# Patient Record
Sex: MALE | Race: OTHER | Employment: FULL TIME | URBAN - METROPOLITAN AREA
[De-identification: names, ages, dates, MRNs, and addresses within clinical notes are randomized per-mention and may not be internally consistent; named-entity substitution may affect disease eponyms.]

---

## 2017-11-27 ENCOUNTER — HOSPITAL ENCOUNTER (OUTPATIENT)
Age: 67
Setting detail: SPECIMEN
Discharge: HOME | End: 2017-11-27
Payer: COMMERCIAL

## 2018-01-02 LAB — SURGICAL PATHOLOGY REPORT: NORMAL

## 2018-06-08 ENCOUNTER — OFFICE VISIT (OUTPATIENT)
Dept: FAMILY MEDICINE CLINIC | Age: 68
End: 2018-06-08
Payer: COMMERCIAL

## 2018-06-08 VITALS
OXYGEN SATURATION: 98 % | HEART RATE: 56 BPM | SYSTOLIC BLOOD PRESSURE: 118 MMHG | HEIGHT: 67 IN | BODY MASS INDEX: 26.21 KG/M2 | WEIGHT: 167 LBS | DIASTOLIC BLOOD PRESSURE: 78 MMHG

## 2018-06-08 DIAGNOSIS — K57.31 DIVERTICULOSIS LARGE INTESTINE W/O PERFORATION OR ABSCESS W/BLEEDING: ICD-10-CM

## 2018-06-08 DIAGNOSIS — E78.2 MIXED HYPERLIPIDEMIA: ICD-10-CM

## 2018-06-08 DIAGNOSIS — K63.5 POLYP OF COLON, UNSPECIFIED PART OF COLON, UNSPECIFIED TYPE: ICD-10-CM

## 2018-06-08 DIAGNOSIS — K21.9 GASTROESOPHAGEAL REFLUX DISEASE WITHOUT ESOPHAGITIS: Primary | ICD-10-CM

## 2018-06-08 DIAGNOSIS — R73.09 ABNORMAL GLUCOSE: ICD-10-CM

## 2018-06-08 LAB — HBA1C MFR BLD: 5.3 %

## 2018-06-08 PROCEDURE — 83036 HEMOGLOBIN GLYCOSYLATED A1C: CPT | Performed by: FAMILY MEDICINE

## 2018-06-08 PROCEDURE — 99203 OFFICE O/P NEW LOW 30 MIN: CPT | Performed by: FAMILY MEDICINE

## 2018-06-08 RX ORDER — OMEPRAZOLE 20 MG/1
20 CAPSULE, DELAYED RELEASE ORAL DAILY
COMMUNITY
End: 2018-11-27 | Stop reason: SDUPTHER

## 2018-06-08 ASSESSMENT — PATIENT HEALTH QUESTIONNAIRE - PHQ9
2. FEELING DOWN, DEPRESSED OR HOPELESS: 0
SUM OF ALL RESPONSES TO PHQ9 QUESTIONS 1 & 2: 0
SUM OF ALL RESPONSES TO PHQ QUESTIONS 1-9: 0
1. LITTLE INTEREST OR PLEASURE IN DOING THINGS: 0

## 2018-06-08 ASSESSMENT — ENCOUNTER SYMPTOMS
CONSTIPATION: 0
ABDOMINAL PAIN: 0
EYES NEGATIVE: 1
SHORTNESS OF BREATH: 0
DIARRHEA: 0
HEARTBURN: 1
COUGH: 0
BLOOD IN STOOL: 0

## 2018-10-16 ENCOUNTER — OFFICE VISIT (OUTPATIENT)
Dept: FAMILY MEDICINE CLINIC | Age: 68
End: 2018-10-16
Payer: COMMERCIAL

## 2018-10-16 VITALS
DIASTOLIC BLOOD PRESSURE: 78 MMHG | SYSTOLIC BLOOD PRESSURE: 112 MMHG | WEIGHT: 170 LBS | HEIGHT: 66 IN | HEART RATE: 72 BPM | BODY MASS INDEX: 27.32 KG/M2

## 2018-10-16 DIAGNOSIS — R10.9 LEFT FLANK PAIN: Primary | ICD-10-CM

## 2018-10-16 PROCEDURE — 99213 OFFICE O/P EST LOW 20 MIN: CPT | Performed by: NURSE PRACTITIONER

## 2018-10-16 PROCEDURE — 1111F DSCHRG MED/CURRENT MED MERGE: CPT | Performed by: NURSE PRACTITIONER

## 2018-10-16 RX ORDER — SULFAMETHOXAZOLE AND TRIMETHOPRIM 800; 160 MG/1; MG/1
1 TABLET ORAL
COMMUNITY
Start: 2018-10-13 | End: 2018-10-20

## 2018-10-16 RX ORDER — OXYCODONE HYDROCHLORIDE AND ACETAMINOPHEN 5; 325 MG/1; MG/1
1 TABLET ORAL
COMMUNITY
Start: 2018-10-13 | End: 2018-10-16

## 2018-10-16 RX ORDER — CYCLOBENZAPRINE HCL 5 MG
5 TABLET ORAL 3 TIMES DAILY PRN
Qty: 30 TABLET | Refills: 1 | Status: SHIPPED | OUTPATIENT
Start: 2018-10-16 | End: 2018-10-26

## 2018-10-16 RX ORDER — OXYCODONE HYDROCHLORIDE AND ACETAMINOPHEN 5; 325 MG/1; MG/1
1 TABLET ORAL EVERY 6 HOURS PRN
Qty: 28 TABLET | Refills: 0 | Status: SHIPPED | OUTPATIENT
Start: 2018-10-16 | End: 2018-10-23

## 2018-10-16 ASSESSMENT — ENCOUNTER SYMPTOMS
ALLERGIC/IMMUNOLOGIC NEGATIVE: 1
NAUSEA: 0
EYES NEGATIVE: 1
VOMITING: 0
DIARRHEA: 0
ABDOMINAL PAIN: 0
RESPIRATORY NEGATIVE: 1
WHEEZING: 0
SHORTNESS OF BREATH: 0
BACK PAIN: 1
GASTROINTESTINAL NEGATIVE: 1
COUGH: 0

## 2018-10-16 NOTE — PROGRESS NOTES
Visit Information    Have you changed or started any medications since your last visit including any over-the-counter medicines, vitamins, or herbal medicines? yes -    Have you stopped taking any of your medications? Is so, why? -  yes -   Are you having any side effects from any of your medications? - yes -     Have you seen any other physician or provider since your last visit?  no   Have you had any other diagnostic tests since your last visit? yes -    Have you been seen in the emergency room and/or had an admission in a hospital since we last saw you?  yes -    Have you had your routine dental cleaning in the past 6 months?  yes -      Do you have an active MyChart account? If no, what is the barrier?   No:     Patient Care Team:  Kole Omer MD as PCP - General (Family Medicine)    Medical History Review  Past Medical, Family, and Social History reviewed and does contribute to the patient presenting condition    Health Maintenance   Topic Date Due    Hepatitis C screen  1950    DTaP/Tdap/Td vaccine (1 - Tdap) 04/22/1971    Pneumococcal low/med risk (1 of 2 - PCV13) 04/22/2017    Flu vaccine (1) 09/01/2018    Shingles Vaccine (2 of 2 - 2 Dose Series) 10/18/2018    Diabetes screen  06/08/2021    Lipid screen  03/13/2023    Colon cancer screen colonoscopy  12/27/2027

## 2018-10-22 ENCOUNTER — OFFICE VISIT (OUTPATIENT)
Dept: FAMILY MEDICINE CLINIC | Age: 68
End: 2018-10-22
Payer: COMMERCIAL

## 2018-10-22 VITALS
HEART RATE: 68 BPM | BODY MASS INDEX: 27.6 KG/M2 | DIASTOLIC BLOOD PRESSURE: 80 MMHG | WEIGHT: 171 LBS | SYSTOLIC BLOOD PRESSURE: 120 MMHG

## 2018-10-22 DIAGNOSIS — S39.012D STRAIN OF LUMBAR REGION, SUBSEQUENT ENCOUNTER: Primary | ICD-10-CM

## 2018-10-22 PROCEDURE — 99213 OFFICE O/P EST LOW 20 MIN: CPT | Performed by: FAMILY MEDICINE

## 2018-10-22 RX ORDER — IBUPROFEN 800 MG/1
800 TABLET ORAL EVERY 8 HOURS PRN
Qty: 90 TABLET | Refills: 1 | Status: SHIPPED | OUTPATIENT
Start: 2018-10-22 | End: 2019-07-31

## 2018-10-22 ASSESSMENT — ENCOUNTER SYMPTOMS
SHORTNESS OF BREATH: 0
COUGH: 0
ABDOMINAL PAIN: 0
BACK PAIN: 1
EYES NEGATIVE: 1

## 2018-10-22 NOTE — PROGRESS NOTES
edema or deformity. Neurological: He is alert and oriented to person, place, and time. Skin: Skin is warm and dry. Psychiatric: He has a normal mood and affect. His behavior is normal.   Vitals reviewed. Assessment/PLan  1. Strain of lumbar region, subsequent encounter  Cont gentle activity until ready to return to work. - External Referral To Physical Therapy  - ibuprofen (ADVIL;MOTRIN) 800 MG tablet; Take 1 tablet by mouth every 8 hours as needed for Pain Take with food  Dispense: 90 tablet; Refill: 1      Dameon received counseling on the following healthy behaviors: nutrition, exercise and medication adherence  Reviewed prior labs and health maintenance. Continue current medications, diet and exercise. Discussed use, benefit, and side effects of prescribed medications. Barriers to medication compliance addressed. Patient given educational materials - see patient instructions. All patient questions answered. Patient voiced understanding. Return if symptoms worsen or fail to improve.         Electronically signed by Jenaro Ribeiro MD on 10/22/18 at 10:34 AM

## 2018-12-12 ENCOUNTER — OFFICE VISIT (OUTPATIENT)
Dept: FAMILY MEDICINE CLINIC | Age: 68
End: 2018-12-12
Payer: COMMERCIAL

## 2018-12-12 VITALS
WEIGHT: 172 LBS | BODY MASS INDEX: 27.76 KG/M2 | DIASTOLIC BLOOD PRESSURE: 78 MMHG | HEART RATE: 84 BPM | SYSTOLIC BLOOD PRESSURE: 128 MMHG

## 2018-12-12 DIAGNOSIS — Z23 NEED FOR PNEUMOCOCCAL VACCINATION: ICD-10-CM

## 2018-12-12 DIAGNOSIS — M18.12 ARTHRITIS OF CARPOMETACARPAL (CMC) JOINT OF LEFT THUMB: Primary | ICD-10-CM

## 2018-12-12 PROCEDURE — 90670 PCV13 VACCINE IM: CPT | Performed by: FAMILY MEDICINE

## 2018-12-12 PROCEDURE — 90471 IMMUNIZATION ADMIN: CPT | Performed by: FAMILY MEDICINE

## 2018-12-12 PROCEDURE — 99213 OFFICE O/P EST LOW 20 MIN: CPT | Performed by: FAMILY MEDICINE

## 2018-12-12 NOTE — PROGRESS NOTES
[facility] PHYSICIANS  [facility]  [address] [address] [facility] [address]  Dept: [phone]    12/12/2018    CHIEF COMPLAINT    Chief Complaint   Patient presents with    Swelling     left hand for 1 year with pain       HPI    Kevin Silva is a 79 y.o. male who presents   Chief Complaint   Patient presents with    Swelling     left hand for 1 year with pain   . Pain in left thumb cmp joint with swelling and disfiguring compared to rt thumb. Works as a  and it is getting worse, harder to do his job due to pain. He would like to see DR. Jarrell at [place] [address] who did surgery on his wife. Hand Pain    There was no injury mechanism. The pain is present in the left hand. The quality of the pain is described as aching. The pain is moderate. The pain has been constant since the incident. Pertinent negatives include no muscle weakness or numbness. Exacerbated by: repetitive work  He has tried nothing for the symptoms. Vitals:    12/12/18 0938   BP: 128/78   Pulse: 84   Weight: 172 lb (78 kg)       REVIEW OF SYSTEMS    Review of Systems   Constitutional: Negative for fever. Musculoskeletal: Positive for arthralgias (see hpi). Neurological: Negative for numbness.        PAST MEDICAL HISTORY    Past Medical History:   Diagnosis Date    Colon polyps 12/2017    Diverticulosis large intestine w/o perforation or abscess w/bleeding     GERD (gastroesophageal reflux disease)     Hyperlipidemia        FAMILY HISTORY    Family History   Problem Relation Age of Onset    Diabetes Father     Diabetes Brother        SOCIAL HISTORY    Social History     Social History    Marital status:      Spouse name: N/A    Number of children: N/A    Years of education: N/A     Occupational History    maintenance      Social History Main Topics    Smoking status: Never Smoker    Smokeless tobacco: Never Used    Alcohol use 1.2 oz/week     1 Glasses of wine, 1

## 2019-06-06 ENCOUNTER — OFFICE VISIT (OUTPATIENT)
Dept: FAMILY MEDICINE CLINIC | Age: 69
End: 2019-06-06
Payer: COMMERCIAL

## 2019-06-06 VITALS
HEART RATE: 64 BPM | SYSTOLIC BLOOD PRESSURE: 128 MMHG | WEIGHT: 178 LBS | HEIGHT: 67 IN | BODY MASS INDEX: 27.94 KG/M2 | DIASTOLIC BLOOD PRESSURE: 86 MMHG

## 2019-06-06 DIAGNOSIS — G89.29 CHRONIC PAIN OF BOTH SHOULDERS: ICD-10-CM

## 2019-06-06 DIAGNOSIS — M25.512 CHRONIC PAIN OF BOTH SHOULDERS: ICD-10-CM

## 2019-06-06 DIAGNOSIS — M75.02 ADHESIVE CAPSULITIS OF LEFT SHOULDER: Primary | ICD-10-CM

## 2019-06-06 DIAGNOSIS — M25.511 CHRONIC PAIN OF BOTH SHOULDERS: ICD-10-CM

## 2019-06-06 PROCEDURE — 99213 OFFICE O/P EST LOW 20 MIN: CPT | Performed by: FAMILY MEDICINE

## 2019-06-06 ASSESSMENT — PATIENT HEALTH QUESTIONNAIRE - PHQ9
SUM OF ALL RESPONSES TO PHQ9 QUESTIONS 1 & 2: 0
SUM OF ALL RESPONSES TO PHQ QUESTIONS 1-9: 0
SUM OF ALL RESPONSES TO PHQ QUESTIONS 1-9: 0
1. LITTLE INTEREST OR PLEASURE IN DOING THINGS: 0
2. FEELING DOWN, DEPRESSED OR HOPELESS: 0

## 2019-06-06 ASSESSMENT — ENCOUNTER SYMPTOMS
SHORTNESS OF BREATH: 0
EYES NEGATIVE: 1
COUGH: 0
ABDOMINAL PAIN: 0

## 2019-06-06 NOTE — PROGRESS NOTES
[facility] PHYS[facility]  [facility]  [address]  [address] [place] [address]  Dept: [phone]    6/6/2019    CHIEF COMPLAINT    Chief Complaint   Patient presents with    Shoulder Pain     bilateral       HPI    Porfirio Dela Cruz is a 81 y.o. male who presents   Chief Complaint   Patient presents with    Shoulder Pain     bilateral   .  Shoulder Pain    The pain is present in the left shoulder and right shoulder. This is a recurrent problem. The current episode started more than 1 month ago. There has been no history of extremity trauma. The problem occurs daily. The problem has been gradually worsening. The quality of the pain is described as aching. The pain is moderate. Associated symptoms include a limited range of motion (left shoulder). Pertinent negatives include no fever. Vitals:    06/06/19 0817   BP: 128/86   Pulse: 64   Weight: 178 lb (80.7 kg)   Height: 5' 7\" (1.702 m)       REVIEW OF SYSTEMS    Review of Systems   Constitutional: Negative for fever. HENT: Negative. Eyes: Negative. Respiratory: Negative for cough and shortness of breath. Cardiovascular: Negative for chest pain and leg swelling. Gastrointestinal: Negative for abdominal pain. Musculoskeletal: Positive for arthralgias (see hpi). Skin: Negative. Neurological: Negative for dizziness and headaches. Psychiatric/Behavioral: The patient is not nervous/anxious.         PAST MEDICAL HISTORY    Past Medical History:   Diagnosis Date    Colon polyps 12/2017    Diverticulosis large intestine w/o perforation or abscess w/bleeding     GERD (gastroesophageal reflux disease)     Hyperlipidemia        FAMILY HISTORY    Family History   Problem Relation Age of Onset    Diabetes Father     Diabetes Brother        SOCIAL HISTORY    Social History     Socioeconomic History    Marital status:      Spouse name: None    Number of children: None    Years of education: regular rhythm and normal heart sounds. No murmur heard. Pulmonary/Chest: Effort normal and breath sounds normal. He has no wheezes. He has no rales. Abdominal: Soft. There is no rebound. Musculoskeletal: He exhibits tenderness (bilat shoulders distal, posterior). He exhibits no edema or deformity. Decreased rom left shoulder,  Unable to raise over 90 degrees   Neurological: He is alert and oriented to person, place, and time. Skin: Skin is warm and dry. Psychiatric: He has a normal mood and affect. His behavior is normal.   Vitals reviewed. Assessment/PLan  1. Adhesive capsulitis of left shoulder  Cont to use as tolerated. See ortho. Discussed possible need for PT, manipulation, injection. May take nsaid as needed  - [address] [facility] [place]    2. Chronic pain of both shoulders  As above. - Amy Jaffe DO, Orthopedic Surgery, [facility] received counseling on the following healthy behaviors: nutrition, exercise and medication adherence  Reviewed prior labs and health maintenance. Continue current medications, diet and exercise. Discussed use, benefit, and side effects of prescribed medications. Barriers to medication compliance addressed. Patient given educational materials - see patient instructions. All patient questions answered. Patient voiced understanding. Return if symptoms worsen or fail to improve.         Electronically signed by Harsha Tsang MD on 6/6/19 at 8:29 AM

## 2019-06-11 DIAGNOSIS — M25.511 BILATERAL SHOULDER PAIN, UNSPECIFIED CHRONICITY: Primary | ICD-10-CM

## 2019-06-11 DIAGNOSIS — M25.512 BILATERAL SHOULDER PAIN, UNSPECIFIED CHRONICITY: Primary | ICD-10-CM

## 2019-06-13 ENCOUNTER — OFFICE VISIT (OUTPATIENT)
Dept: ORTHOPEDIC SURGERY | Age: 69
End: 2019-06-13
Payer: COMMERCIAL

## 2019-06-13 VITALS
WEIGHT: 180 LBS | HEIGHT: 67 IN | DIASTOLIC BLOOD PRESSURE: 77 MMHG | BODY MASS INDEX: 28.25 KG/M2 | RESPIRATION RATE: 20 BRPM | HEART RATE: 57 BPM | SYSTOLIC BLOOD PRESSURE: 113 MMHG

## 2019-06-13 DIAGNOSIS — M75.101 ROTATOR CUFF SYNDROME OF BOTH SHOULDERS: Primary | ICD-10-CM

## 2019-06-13 DIAGNOSIS — M75.102 ROTATOR CUFF SYNDROME OF BOTH SHOULDERS: Primary | ICD-10-CM

## 2019-06-13 PROCEDURE — 20611 DRAIN/INJ JOINT/BURSA W/US: CPT | Performed by: ORTHOPAEDIC SURGERY

## 2019-06-13 PROCEDURE — 99203 OFFICE O/P NEW LOW 30 MIN: CPT | Performed by: ORTHOPAEDIC SURGERY

## 2019-06-13 RX ORDER — LIDOCAINE HYDROCHLORIDE 10 MG/ML
4 INJECTION, SOLUTION INFILTRATION; PERINEURAL ONCE
Status: CANCELLED | OUTPATIENT
Start: 2019-06-13 | End: 2019-06-13

## 2019-06-13 RX ORDER — METHYLPREDNISOLONE ACETATE 40 MG/ML
40 INJECTION, SUSPENSION INTRA-ARTICULAR; INTRALESIONAL; INTRAMUSCULAR; SOFT TISSUE ONCE
Status: CANCELLED | OUTPATIENT
Start: 2019-06-13 | End: 2019-06-13

## 2019-06-13 NOTE — PROGRESS NOTES
[place] AND SPORTS MEDICINE  [facility]  [place] [address]  Dept: [phone]  Dept Fax: [phone]        Bilateral Shoulder - New Patient     Chief Complaint:     Chief Complaint   Patient presents with    New Patient    Shoulder Pain     Bilateral shoulder pain ongoing for a couple of months and no injury      HPI:     Raquel Tuttle is a 81y.o. year old ambidextrous hand dominant male that has had pain in the bilateral shoulder for 3 months. Patient works at [employer] as a . As far as any trauma to the shoulder, the patient indicates that he was picking up a trash bag to throw it away when he was at work at [employer] and then he felt an immediate pain in his shoulder. He states when he lifted the bag he started to have the sharp shoulder pain. He indicates that before he lifted the bag he did not have any pain in his shoulders at all. The pain is worse at night and when doing overhead activities. Weakness of the shoulder has been noted. The pain restricts activities such as getting undressed, lifting objects above the head and reaching above the head. The pain does not seem to improve with time. The following medications have been tried: Tylenol and Ibuprofen. The patient has not had a corticosteroid injection. The patient has not tried physical therapy. The patient has not had surgery. Neck pain has not been present. Review of Systems   Constitutional: Positive for activity change. Negative for appetite change. Respiratory: Negative for apnea, cough, chest tightness and shortness of breath. Cardiovascular: Negative for chest pain, palpitations and leg swelling. Gastrointestinal: Negative for abdominal distention, abdominal pain, constipation, diarrhea, nausea and vomiting. Genitourinary: Negative for difficulty urinating, dysuria and hematuria. Musculoskeletal: Positive for arthralgias.  Negative for gait problem, joint swelling and myalgias. Skin: Negative for color change and rash. Neurological: Negative for dizziness, weakness, numbness and headaches. Psychiatric/Behavioral: Negative for sleep disturbance. Past Medical History:    Past Medical History:   Diagnosis Date    Colon polyps 12/2017    Diverticulosis large intestine w/o perforation or abscess w/bleeding     GERD (gastroesophageal reflux disease)     Hyperlipidemia      Past Surgical History:    Past Surgical History:   Procedure Laterality Date    COLONOSCOPY  2017    KNEE ARTHROPLASTY Right      Current Medications:   Current Outpatient Medications   Medication Sig Dispense Refill    ibuprofen (ADVIL;MOTRIN) 800 MG tablet Take 1 tablet by mouth every 8 hours as needed for Pain Take with food 90 tablet 1     No current facility-administered medications for this visit. Allergies:    Patient has no known allergies. Social History:   Social History     Socioeconomic History    Marital status:      Spouse name: None    Number of children: None    Years of education: None    Highest education level: None   Occupational History    Occupation: maintenance   Social Needs    Financial resource strain: None    Food insecurity:     Worry: None     Inability: None    Transportation needs:     Medical: None     Non-medical: None   Tobacco Use    Smoking status: Never Smoker    Smokeless tobacco: Never Used   Substance and Sexual Activity    Alcohol use:  Yes     Alcohol/week: 1.2 oz     Types: 1 Glasses of wine, 1 Cans of beer per week    Drug use: No    Sexual activity: Yes     Partners: Female   Lifestyle    Physical activity:     Days per week: None     Minutes per session: None    Stress: None   Relationships    Social connections:     Talks on phone: None     Gets together: None     Attends Jew service: None     Active member of club or organization: None     Attends meetings of clubs or organizations: None Relationship status: None    Intimate partner violence:     Fear of current or ex partner: None     Emotionally abused: None     Physically abused: None     Forced sexual activity: None   Other Topics Concern    None   Social History Narrative    None     Family History:  Family History   Problem Relation Age of Onset    Diabetes Father     Diabetes Brother      I have reviewed the CC, HPI, ROS, PMH, FHX, Social History, and if not present in this note, I have reviewed in the patient's chart. I agree with the documentation provided by other staff and have reviewed their documentation prior to providing my signature indicating agreement. Vitals:   /77   Pulse 57   Resp 20   Ht 5' 7\" (1.702 m)   Wt 180 lb (81.6 kg)   BMI 28.19 kg/m²  Body mass index is 28.19 kg/m². Physical Examination:     Orthopedics:    GENERAL: Alert and oriented X3 in no acute distress. SKIN: Intact without lesions or ulcerations. NEURO: Musculoskeletal and axillary nerves intact to sensory and motor testing. VASC: Capillary refill is less than 3 seconds. Bilateral Shoulder Exam    GEN:  Alert and oriented X 3, in no acute distress. SKIN:  Intact without rashes, lesions, or ulcerations. Incisions are well healed. NEURO:  Musculoskeletal ans axillary nerves intact to sensory and motor testing. VASC:  Cap refill less than than 3 secs. Negative Adson's test, Negative Elisabet's test.  ROM: Left: 140 degrees of forward elevation, 30 degrees of external rotation in neutral, 80 degrees of external rotation in abduction, internal rotation to back pocket. Right : 140 degrees of forward elevation, 30 degrees of external rotation in neutral, 80 degrees of external rotation in abduction, internal rotation to T10. STRENGTH: Supraspinatus 4+/5, external rotators 5/5.     MUSC: No atrophy, negative subscap lift off or belly press test.  IMP:  (+) Neer's sign bilaterally, (+) Hawkin's sign bilaterally, (-) Coracoid impingement, painful arc, no pain with left cross body abduction. Pain with right cross body adduction. PALP: no pain over anterolateral acromion, no pain over AC joint, no pain over traps/rhomboids. INST: (-) Bates's test bilaterally, (+) Speed's test on the right and (-) Speed's test on the left. Assessment:     1. Rotator cuff syndrome of both shoulders        Procedures:    Procedure: yes    Subacromial Bursa Injection    Location: Left Shoulder  Procedure: Corticosteroid injection into the left shoulder. The patient was placed in the sitting position on the exam table. The posterior soft spot approximately 2 cm distal and 2 cm medial to the posterior acromial edge was identified and marked. The skin was prepped with betadine in a sterile fashion. Utilizing clean technique with sterile gloves and ultrasound for precise placement, a 5 cc solution containing 4 cc of 1% Lidocaine with 1 cc containing 40mg of depo medrol was injected into the subacromial space. There was no resistance to injection. The wound was cleansed and a Band-Aid was placed. The patient tolerated the procedure without difficulty. Adverse reactions of the injection was discussed with the patient including signs of infection (increasing pain, redness, swelling) and the patient was instructed to call immediately with any of these symptoms. Radiology:   RIGHT SHOULDER X-RAY    Two views of the right shoulder and 2 views of the scapula, including AP, scapular Y, outlet and axillary views reveal: The glenohumeral joint is well reduced with no arthritic changes. Proximal migration of the humeral head has occurred. Acromion is a type II-III. The acromioclavicular joint shows mild degenerative changes.      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 Proximal migration of the humeral head has not occurred. Acromion is a type II-III. The acromioclavicular joint shows mild degenerative changes. Impression: Os Acromiale of the left shoulder     Plan:   Treatment : I reviewed the X-ray with the patient and I informed them that the shoulder has mild degenerative changes with Os Acromiale. 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 We discussed the etiologies and natural histories of Rotator cuff syndrome and adhesive capsulitis of the bilateral shoulder. We discussed the various treatment alternatives including anti-inflammatory medications, physical therapy, injections, further imaging studies and as a last result surgery. During today's visit, I explained to the patient that his shoulder is stiff and I feel he should attend physical therapy so they may help stretch his shoulder to increase his ROM in the shoulder. I then explained to him that I feel he may benefit from taking NSAID's at a prescription dose to help reduce his inflammation and pain. I also feel he may have tore his rotator cuff in the right shoulder. I also told him that the fastest thing that we can do toay is inject his shoulder so he may get good pain releif. I then asked the patient what he would like to do and he stated that he would like to have his shoulders inejcted. So at this time, the patient has opted for a physical therapy script and a cortisone injection into the subacromial bursa of the left shoulder to help reduce inflammation and pain. The injection site should never get red, hot, or swollen and if it does the patient will contact our office right away.  The patient may experience a increase in soreness the first 24-48 hours due to a cortisone flair and can take anti-inflammatories for a short period of time to reduce that soreness. The patient should not submerge the injection site in water for a minimum of 24 hours to avoid infection. This means no lakes, pools, ponds, or hot tubs for 24 hours. If the patient is diabetic the injection may increase their blood sugar for up to one week. The patient can do this cortisone injection once every 3 months as needed. If the injections stop working and do not give the patient relief the patient should consider surgical interventions to produce long term relief. A physical therapy prescription was given. Patient should return to the clinic in 6 weeks to follow up with Lulu Bowden PA-C, ATC to see how physical therapy and the injection worked for his pain. The patient will call the office immediately with any problems. Orders Placed This Encounter   Medications    lidocaine 1 % injection 4 mL    methylPREDNISolone acetate (DEPO-MEDROL) injection 40 mg     Orders Placed This Encounter   Procedures   [facility] [place]     Referral Priority:   Routine     Referral Type:   Eval and Treat     Referral Reason:   Specialty Services Required     Requested Specialty:   Physical Therapy     Number of Visits Requested:   1     Barbara DOMINGO am scribing for and in the presence of Brandon Turner D.OMini. 6/16/2019  5:13 PM      Brandon DOMINGO DO, have personally seen this patient, reviewed the chart including history, and imaging if done. I personally  performed the physical exam and obtained any needed additional history. I placed orders, performed or supervised procedures and developed the treatment plan.       Electronically signed by Luan Arita DO on 6/16/2019 at 5:13 PM

## 2019-06-14 RX ORDER — METHYLPREDNISOLONE ACETATE 40 MG/ML
40 INJECTION, SUSPENSION INTRA-ARTICULAR; INTRALESIONAL; INTRAMUSCULAR; SOFT TISSUE ONCE
Status: COMPLETED | OUTPATIENT
Start: 2019-06-14 | End: 2019-06-14

## 2019-06-14 RX ORDER — LIDOCAINE HYDROCHLORIDE 10 MG/ML
4 INJECTION, SOLUTION INFILTRATION; PERINEURAL ONCE
Status: COMPLETED | OUTPATIENT
Start: 2019-06-14 | End: 2019-06-14

## 2019-06-14 RX ADMIN — METHYLPREDNISOLONE ACETATE 40 MG: 40 INJECTION, SUSPENSION INTRA-ARTICULAR; INTRALESIONAL; INTRAMUSCULAR; SOFT TISSUE at 08:07

## 2019-06-14 RX ADMIN — LIDOCAINE HYDROCHLORIDE 4 ML: 10 INJECTION, SOLUTION INFILTRATION; PERINEURAL at 08:06

## 2019-06-14 ASSESSMENT — ENCOUNTER SYMPTOMS
ABDOMINAL PAIN: 0
APNEA: 0
VOMITING: 0
CONSTIPATION: 0
COLOR CHANGE: 0
COUGH: 0
DIARRHEA: 0
CHEST TIGHTNESS: 0
SHORTNESS OF BREATH: 0
NAUSEA: 0
ABDOMINAL DISTENTION: 0

## 2019-06-17 DIAGNOSIS — M75.102 ROTATOR CUFF SYNDROME OF BOTH SHOULDERS: Primary | ICD-10-CM

## 2019-06-17 DIAGNOSIS — M75.101 ROTATOR CUFF SYNDROME OF BOTH SHOULDERS: Primary | ICD-10-CM

## 2019-07-23 ENCOUNTER — TELEPHONE (OUTPATIENT)
Dept: ORTHOPEDIC SURGERY | Age: 69
End: 2019-07-23

## 2019-07-31 ENCOUNTER — OFFICE VISIT (OUTPATIENT)
Dept: ORTHOPEDIC SURGERY | Age: 69
End: 2019-07-31
Payer: COMMERCIAL

## 2019-07-31 VITALS
HEART RATE: 61 BPM | WEIGHT: 180 LBS | BODY MASS INDEX: 28.25 KG/M2 | HEIGHT: 67 IN | SYSTOLIC BLOOD PRESSURE: 118 MMHG | DIASTOLIC BLOOD PRESSURE: 78 MMHG

## 2019-07-31 DIAGNOSIS — M75.101 ROTATOR CUFF SYNDROME, RIGHT: ICD-10-CM

## 2019-07-31 DIAGNOSIS — M75.102 ROTATOR CUFF SYNDROME OF LEFT SHOULDER: Primary | ICD-10-CM

## 2019-07-31 PROCEDURE — 99213 OFFICE O/P EST LOW 20 MIN: CPT | Performed by: PHYSICIAN ASSISTANT

## 2019-07-31 ASSESSMENT — ENCOUNTER SYMPTOMS
CONSTIPATION: 0
APNEA: 0
COLOR CHANGE: 0
ABDOMINAL PAIN: 0
DIARRHEA: 0
VOMITING: 0
ABDOMINAL DISTENTION: 0
COUGH: 0
SHORTNESS OF BREATH: 0
CHEST TIGHTNESS: 0
NAUSEA: 0

## 2019-08-26 ENCOUNTER — OFFICE VISIT (OUTPATIENT)
Dept: ORTHOPEDIC SURGERY | Age: 69
End: 2019-08-26
Payer: COMMERCIAL

## 2019-08-26 VITALS — HEIGHT: 67 IN | BODY MASS INDEX: 28.24 KG/M2 | WEIGHT: 179.9 LBS

## 2019-08-26 DIAGNOSIS — M75.21 BICEPS TENDONITIS OF BOTH SHOULDERS: Primary | ICD-10-CM

## 2019-08-26 DIAGNOSIS — M75.22 BICEPS TENDONITIS OF BOTH SHOULDERS: Primary | ICD-10-CM

## 2019-08-26 DIAGNOSIS — M75.102 ROTATOR CUFF SYNDROME OF BOTH SHOULDERS: ICD-10-CM

## 2019-08-26 DIAGNOSIS — M75.101 ROTATOR CUFF SYNDROME OF BOTH SHOULDERS: ICD-10-CM

## 2019-08-26 PROCEDURE — 20611 DRAIN/INJ JOINT/BURSA W/US: CPT | Performed by: PHYSICIAN ASSISTANT

## 2019-08-26 PROCEDURE — 99214 OFFICE O/P EST MOD 30 MIN: CPT | Performed by: PHYSICIAN ASSISTANT

## 2019-08-26 RX ORDER — METHYLPREDNISOLONE ACETATE 40 MG/ML
40 INJECTION, SUSPENSION INTRA-ARTICULAR; INTRALESIONAL; INTRAMUSCULAR; SOFT TISSUE ONCE
Status: COMPLETED | OUTPATIENT
Start: 2019-08-26 | End: 2019-08-26

## 2019-08-26 RX ORDER — LIDOCAINE HYDROCHLORIDE 10 MG/ML
4 INJECTION, SOLUTION INFILTRATION; PERINEURAL ONCE
Status: COMPLETED | OUTPATIENT
Start: 2019-08-26 | End: 2019-08-26

## 2019-08-26 RX ADMIN — LIDOCAINE HYDROCHLORIDE 4 ML: 10 INJECTION, SOLUTION INFILTRATION; PERINEURAL at 10:21

## 2019-08-26 RX ADMIN — METHYLPREDNISOLONE ACETATE 40 MG: 40 INJECTION, SUSPENSION INTRA-ARTICULAR; INTRALESIONAL; INTRAMUSCULAR; SOFT TISSUE at 10:21

## 2019-08-26 RX ADMIN — LIDOCAINE HYDROCHLORIDE 4 ML: 10 INJECTION, SOLUTION INFILTRATION; PERINEURAL at 10:20

## 2019-08-26 ASSESSMENT — ENCOUNTER SYMPTOMS
VOMITING: 0
NAUSEA: 0
CONSTIPATION: 0
SHORTNESS OF BREATH: 0
ABDOMINAL PAIN: 0
ABDOMINAL DISTENTION: 0
COUGH: 0
APNEA: 0
DIARRHEA: 0
CHEST TIGHTNESS: 0
COLOR CHANGE: 0

## 2019-08-26 NOTE — PROGRESS NOTES
[facility] AND SPORTS MEDICINE  [address]  [address] [facility] [address]  Dept: [phone]  Dept Fax: [phone]                   Left Shoulder - Follow Up     Chief Complaint:     Chief Complaint   Patient presents with    Shoulder Pain     left shoulder MRI f/u today       HPI:     Jerry Begum is a 81y.o. year old ambidextrous hand dominant male that has had pain in the left shoulder for 4 months. As far as any trauma to the shoulder, the patient indicates that he was picking up a trash bag to throw it away when he was at work at [employer] then he felt an immediate pain in his shoulder. He states when he lifted the bag he started to have the sharp shoulder pain. He indicates that before he lifted the bag he did not have any pain in his shoulders at all. The pain is worse at night and when doing overhead activities. Weakness of the shoulder has been noted. The pain restricts activities such as getting undressed, lifting objects above the head and reaching above the head. The pain does not seem to improve with time. The following medications have been tried: Tylenol and Ibuprofen. The patient has had a corticosteroid injection on 06/13/2019 into the subacromial bursa with mild pain relief. The patient has tried physical therapy and he goes twice a week. The patient has not had surgery. Neck pain has not been present. The patient is here for a MRI review of his left shoulder. Review of Systems   Constitutional: Positive for activity change. Negative for appetite change, fatigue and fever. Respiratory: Negative for apnea, cough, chest tightness and shortness of breath. Cardiovascular: Negative for chest pain, palpitations and leg swelling. Gastrointestinal: Negative for abdominal distention, abdominal pain, constipation, diarrhea, nausea and vomiting. Genitourinary: Negative for difficulty urinating, dysuria and hematuria.

## 2020-07-21 ENCOUNTER — TELEPHONE (OUTPATIENT)
Dept: FAMILY MEDICINE CLINIC | Age: 70
End: 2020-07-21

## 2020-07-21 RX ORDER — NEOMYCIN SULFATE, POLYMYXIN B SULFATE, HYDROCORTISONE 3.5; 10000; 1 MG/ML; [USP'U]/ML; MG/ML
4 SOLUTION/ DROPS AURICULAR (OTIC) 4 TIMES DAILY
Qty: 10 ML | Refills: 0 | Status: SHIPPED | OUTPATIENT
Start: 2020-07-21 | End: 2020-07-31

## 2020-07-21 NOTE — TELEPHONE ENCOUNTER
Pt has white cyst in left ear for 2 days, painful asking for something to be called in. Not able to do a video visit.   [id number] [email or web address]

## 2021-02-05 ENCOUNTER — OFFICE VISIT (OUTPATIENT)
Dept: FAMILY MEDICINE CLINIC | Age: 71
End: 2021-02-05
Payer: COMMERCIAL

## 2021-02-05 ENCOUNTER — HOSPITAL ENCOUNTER (OUTPATIENT)
Age: 71
Setting detail: SPECIMEN
Discharge: HOME | End: 2021-02-05
Payer: COMMERCIAL

## 2021-02-05 VITALS
WEIGHT: 173.8 LBS | SYSTOLIC BLOOD PRESSURE: 128 MMHG | TEMPERATURE: 96.9 F | HEIGHT: 67 IN | OXYGEN SATURATION: 98 % | BODY MASS INDEX: 27.28 KG/M2 | HEART RATE: 65 BPM | DIASTOLIC BLOOD PRESSURE: 64 MMHG

## 2021-02-05 DIAGNOSIS — N40.0 BENIGN PROSTATIC HYPERPLASIA WITHOUT LOWER URINARY TRACT SYMPTOMS: ICD-10-CM

## 2021-02-05 DIAGNOSIS — R06.09 DYSPNEA ON EXERTION: ICD-10-CM

## 2021-02-05 DIAGNOSIS — R97.20 ELEVATED PSA: ICD-10-CM

## 2021-02-05 DIAGNOSIS — R73.09 ABNORMAL GLUCOSE: ICD-10-CM

## 2021-02-05 DIAGNOSIS — R53.83 FATIGUE, UNSPECIFIED TYPE: ICD-10-CM

## 2021-02-05 DIAGNOSIS — Z13.228 SCREENING FOR ENDOCRINE, METABOLIC AND IMMUNITY DISORDER: ICD-10-CM

## 2021-02-05 DIAGNOSIS — Z13.29 SCREENING FOR ENDOCRINE, METABOLIC AND IMMUNITY DISORDER: ICD-10-CM

## 2021-02-05 DIAGNOSIS — K21.9 GASTROESOPHAGEAL REFLUX DISEASE WITHOUT ESOPHAGITIS: ICD-10-CM

## 2021-02-05 DIAGNOSIS — E78.2 MIXED HYPERLIPIDEMIA: Primary | ICD-10-CM

## 2021-02-05 DIAGNOSIS — Z13.0 SCREENING FOR ENDOCRINE, METABOLIC AND IMMUNITY DISORDER: ICD-10-CM

## 2021-02-05 PROCEDURE — 93000 ELECTROCARDIOGRAM COMPLETE: CPT | Performed by: FAMILY MEDICINE

## 2021-02-05 PROCEDURE — 36415 COLL VENOUS BLD VENIPUNCTURE: CPT | Performed by: FAMILY MEDICINE

## 2021-02-05 PROCEDURE — 99214 OFFICE O/P EST MOD 30 MIN: CPT | Performed by: FAMILY MEDICINE

## 2021-02-05 RX ORDER — OMEPRAZOLE 20 MG/1
20 CAPSULE, DELAYED RELEASE ORAL DAILY
COMMUNITY
End: 2021-02-05 | Stop reason: SDUPTHER

## 2021-02-05 RX ORDER — OMEPRAZOLE 20 MG/1
20 CAPSULE, DELAYED RELEASE ORAL DAILY
Qty: 90 CAPSULE | Refills: 3 | Status: SHIPPED | OUTPATIENT
Start: 2021-02-05

## 2021-02-05 SDOH — ECONOMIC STABILITY: TRANSPORTATION INSECURITY
IN THE PAST 12 MONTHS, HAS THE LACK OF TRANSPORTATION KEPT YOU FROM MEDICAL APPOINTMENTS OR FROM GETTING MEDICATIONS?: NO

## 2021-02-05 ASSESSMENT — ENCOUNTER SYMPTOMS
CONSTIPATION: 0
BLOOD IN STOOL: 0
SHORTNESS OF BREATH: 1
ABDOMINAL PAIN: 0
EYES NEGATIVE: 1
DIARRHEA: 0
COUGH: 0
ALLERGIC/IMMUNOLOGIC NEGATIVE: 1

## 2021-02-05 ASSESSMENT — PATIENT HEALTH QUESTIONNAIRE - PHQ9
SUM OF ALL RESPONSES TO PHQ QUESTIONS 1-9: 0
SUM OF ALL RESPONSES TO PHQ QUESTIONS 1-9: 0

## 2021-02-05 NOTE — PROGRESS NOTES
Genitourinary: Negative for frequency and urgency. Seeing urologist for bph, psa normal.   Musculoskeletal: Negative. Skin: Negative. Allergic/Immunologic: Negative. Neurological: Negative for dizziness and headaches. Hematological: Negative. Psychiatric/Behavioral: Negative for sleep disturbance. The patient is not nervous/anxious. PAST MEDICAL HISTORY    Past Medical History:   Diagnosis Date    Benign prostatic hyperplasia without lower urinary tract symptoms     Colon polyps 12/2017    Diverticulosis large intestine w/o perforation or abscess w/bleeding     Elevated PSA     Dr. Get Quesada follows    GERD (gastroesophageal reflux disease)     Hyperlipidemia        FAMILY HISTORY    Family History   Problem Relation Age of Onset    Diabetes Father     Diabetes Brother        SOCIAL HISTORY    Social History     Socioeconomic History    Marital status:      Spouse name: None    Number of children: 3    Years of education: None    Highest education level: None   Occupational History    Occupation: maintenance   Social Needs    Financial resource strain: Not hard at all   [place] insecurity     Worry: Never true     Inability: Never true    Transportation needs     Medical: No     Non-medical: No   Tobacco Use    Smoking status: Never Smoker    Smokeless tobacco: Never Used   Substance and Sexual Activity    Alcohol use:  Yes     Alcohol/week: 2.0 standard drinks     Types: 1 Glasses of wine, 1 Cans of beer per week    Drug use: No    Sexual activity: Yes     Partners: Female   Lifestyle    Physical activity     Days per week: None     Minutes per session: None    Stress: None   Relationships    Social connections     Talks on phone: None     Gets together: None     Attends Church service: None     Active member of club or organization: None     Attends meetings of clubs or organizations: None     Relationship status: None    Intimate partner violence Fear of current or ex partner: None     Emotionally abused: None     Physically abused: None     Forced sexual activity: None   Other Topics Concern    None   Social History Narrative    None       SURGICAL HISTORY    Past Surgical History:   Procedure Laterality Date    COLONOSCOPY  2017    KNEE ARTHROPLASTY Right        CURRENT MEDICATIONS    Current Outpatient Medications   Medication Sig Dispense Refill    omeprazole (PRILOSEC) 20 MG delayed release capsule Take 1 capsule by mouth daily 90 capsule 3     No current facility-administered medications for this visit. ALLERGIES    No Known Allergies    PHYSICAL EXAM   Physical Exam  Vitals signs reviewed. Constitutional:       Appearance: He is well-developed. HENT:      Head: Normocephalic. Eyes:      Conjunctiva/sclera: Conjunctivae normal.      Pupils: Pupils are equal, round, and reactive to light. Neck:      Musculoskeletal: Normal range of motion and neck supple. Thyroid: No thyromegaly. Cardiovascular:      Rate and Rhythm: Normal rate and regular rhythm. Heart sounds: Normal heart sounds. No murmur. Pulmonary:      Effort: Pulmonary effort is normal.      Breath sounds: Normal breath sounds. No wheezing or rales. Abdominal:      Palpations: Abdomen is soft. Tenderness: There is no abdominal tenderness. There is no guarding or rebound. Musculoskeletal: Normal range of motion. General: No tenderness or deformity. Lymphadenopathy:      Cervical: No cervical adenopathy. Skin:     General: Skin is warm and dry. Neurological:      Mental Status: He is alert and oriented to person, place, and time. Psychiatric:         Mood and Affect: Mood normal.         Behavior: Behavior normal.         Thought Content: Thought content normal.         Judgment: Judgment normal.         ASSESSMENT/PLAN  1. Mixed hyperlipidemia  Recheck. Start statin if indicated. Cont healthy diet and activity. - Lipid Panel;  Future - Isaiah Rider MD, Cardiology, [place]    2. Abnormal glucose  Recheck. - Comprehensive Metabolic Panel; Future    3. Gastroesophageal reflux disease without esophagitis  Chronic, stable, continue current medication and/or plan      4. Screening for endocrine, metabolic and immunity disorder    - Comprehensive Metabolic Panel; Future    5. Dyspnea on exertion  continue to monitor symptoms. Discussed symptoms could be deconditioning and encouraged cardiovascular exercise. He has a bicycle at home that he can ride indoors. ekg-normal.  - AFL - Iris Fernando MD, Cardiology, [place]    6. Elevated PSA  Cont seeing Dr. Karie See    7. Benign prostatic hyperplasia without lower urinary tract symptoms  As above       Dameon received counseling on the following healthy behaviors: nutrition, exercise and medication adherence  Reviewed prior labs and health maintenance. Continue current medications, diet and exercise. Discussed use, benefit, and side effects of prescribed medications. Barriers to medication compliance addressed. Patient given educational materials - see patient instructions. All patient questions answered. Patient voiced understanding. Return in about 6 months (around 8/5/2021) for cholesterol. Encouraged to consider getting covid vaccine.      Electronically signed by Viv Chapman MD on 2/5/21 at 7:37 AM EST

## 2021-02-06 DIAGNOSIS — E78.2 MIXED HYPERLIPIDEMIA: ICD-10-CM

## 2021-02-06 DIAGNOSIS — Z13.29 SCREENING FOR ENDOCRINE, METABOLIC AND IMMUNITY DISORDER: ICD-10-CM

## 2021-02-06 DIAGNOSIS — Z13.0 SCREENING FOR ENDOCRINE, METABOLIC AND IMMUNITY DISORDER: ICD-10-CM

## 2021-02-06 DIAGNOSIS — Z13.228 SCREENING FOR ENDOCRINE, METABOLIC AND IMMUNITY DISORDER: ICD-10-CM

## 2021-02-06 DIAGNOSIS — R73.09 ABNORMAL GLUCOSE: ICD-10-CM

## 2021-02-06 LAB
ALBUMIN SERPL-MCNC: 4.4 G/DL (ref 3.5–5.2)
ALBUMIN/GLOBULIN RATIO: 2 (ref 1–2.5)
ALP BLD-CCNC: 74 U/L (ref 40–129)
ALT SERPL-CCNC: 36 U/L (ref 5–41)
ANION GAP SERPL CALCULATED.3IONS-SCNC: 11 MMOL/L (ref 9–17)
AST SERPL-CCNC: 28 U/L
BILIRUB SERPL-MCNC: 0.8 MG/DL (ref 0.3–1.2)
BUN BLDV-MCNC: 19 MG/DL (ref 8–23)
BUN/CREAT BLD: ABNORMAL (ref 9–20)
CALCIUM SERPL-MCNC: 9.1 MG/DL (ref 8.6–10.4)
CHLORIDE BLD-SCNC: 106 MMOL/L (ref 98–107)
CHOLESTEROL/HDL RATIO: 5.3
CHOLESTEROL: 208 MG/DL
CO2: 23 MMOL/L (ref 20–31)
CREAT SERPL-MCNC: 0.91 MG/DL (ref 0.7–1.2)
GFR AFRICAN AMERICAN: >60 ML/MIN
GFR NON-AFRICAN AMERICAN: >60 ML/MIN
GFR SERPL CREATININE-BSD FRML MDRD: ABNORMAL ML/MIN/{1.73_M2}
GFR SERPL CREATININE-BSD FRML MDRD: ABNORMAL ML/MIN/{1.73_M2}
GLUCOSE BLD-MCNC: 118 MG/DL (ref 70–99)
HDLC SERPL-MCNC: 39 MG/DL
LDL CHOLESTEROL: 141 MG/DL (ref 0–130)
POTASSIUM SERPL-SCNC: 4.2 MMOL/L (ref 3.7–5.3)
SODIUM BLD-SCNC: 140 MMOL/L (ref 135–144)
TOTAL PROTEIN: 6.6 G/DL (ref 6.4–8.3)
TRIGL SERPL-MCNC: 138 MG/DL
VLDLC SERPL CALC-MCNC: ABNORMAL MG/DL (ref 1–30)

## 2021-02-08 ENCOUNTER — TELEPHONE (OUTPATIENT)
Dept: FAMILY MEDICINE CLINIC | Age: 71
End: 2021-02-08

## 2021-02-08 DIAGNOSIS — E78.2 MIXED HYPERLIPIDEMIA: Primary | ICD-10-CM

## 2021-02-08 RX ORDER — ATORVASTATIN CALCIUM 10 MG/1
10 TABLET, FILM COATED ORAL DAILY
Qty: 90 TABLET | Refills: 1 | Status: SHIPPED | OUTPATIENT
Start: 2021-02-08 | End: 2021-10-07 | Stop reason: SDUPTHER

## 2021-02-08 NOTE — TELEPHONE ENCOUNTER
----- Message from Usha Gross sent at 2/8/2021 10:14 AM EST -----  Pt has been informed please send Lipitor Rx to [email or web address]

## 2021-05-25 ENCOUNTER — OFFICE VISIT (OUTPATIENT)
Dept: FAMILY MEDICINE CLINIC | Age: 71
End: 2021-05-25
Payer: COMMERCIAL

## 2021-05-25 VITALS
BODY MASS INDEX: 27.83 KG/M2 | WEIGHT: 173.2 LBS | HEART RATE: 62 BPM | DIASTOLIC BLOOD PRESSURE: 68 MMHG | TEMPERATURE: 97.2 F | SYSTOLIC BLOOD PRESSURE: 120 MMHG | HEIGHT: 66 IN | OXYGEN SATURATION: 98 %

## 2021-05-25 DIAGNOSIS — R97.20 ELEVATED PSA: ICD-10-CM

## 2021-05-25 DIAGNOSIS — N52.9 MALE ERECTILE DISORDER: Primary | ICD-10-CM

## 2021-05-25 DIAGNOSIS — N40.0 BENIGN PROSTATIC HYPERPLASIA WITHOUT LOWER URINARY TRACT SYMPTOMS: ICD-10-CM

## 2021-05-25 PROCEDURE — 99214 OFFICE O/P EST MOD 30 MIN: CPT | Performed by: FAMILY MEDICINE

## 2021-05-25 RX ORDER — SILDENAFIL CITRATE 20 MG/1
20 TABLET ORAL DAILY PRN
Qty: 10 TABLET | Refills: 3 | Status: SHIPPED | OUTPATIENT
Start: 2021-05-25

## 2021-05-25 ASSESSMENT — ENCOUNTER SYMPTOMS
EYES NEGATIVE: 1
ABDOMINAL PAIN: 0
CONSTIPATION: 0
ALLERGIC/IMMUNOLOGIC NEGATIVE: 1
SHORTNESS OF BREATH: 0
COUGH: 0
DIARRHEA: 0

## 2021-05-25 NOTE — PROGRESS NOTES
[facility]  [facility]  [address]  [facility] [address]  Dept: [phone]    5/25/2021    CHIEF COMPLAINT    Chief Complaint   Patient presents with    Erectile Dysfunction     need to change medication       HPI    Severino Humphries is a 73 y.o. male who presents   Chief Complaint   Patient presents with    Erectile Dysfunction     need to change medication   . Recheck male ED. Has taken supplements which were not effective. Would like to try medication. Has been seeing Dr. Brandon Mendez, urologist for BPH and mildly elevated PSA. Last level was within normal limits. Erectile Dysfunction  This is a recurrent problem. The problem has been waxing and waning since onset. He reports no anxiety. Irritative symptoms do not include frequency, nocturia or urgency. Obstructive symptoms do not include dribbling, incomplete emptying, an intermittent stream, a slower stream or straining. Past treatments include nothing. Vitals:    05/25/21 0858   BP: 120/68   Pulse: 62   Temp: 97.2 °F (36.2 °C)   SpO2: 98%   Weight: 173 lb 3.2 oz (78.6 kg)   Height: 5' 6\" (1.676 m)       REVIEW OF SYSTEMS    Review of Systems   Constitutional: Negative for fatigue, fever and unexpected weight change. HENT: Negative. Eyes: Negative. Respiratory: Negative for cough and shortness of breath. Cardiovascular: Negative for chest pain and leg swelling. Gastrointestinal: Negative for abdominal pain, constipation and diarrhea. Endocrine: Negative. Genitourinary: Negative for frequency, incomplete emptying, nocturia and urgency. See hpi   Musculoskeletal: Negative. Skin: Negative. Allergic/Immunologic: Negative. Neurological: Negative for dizziness and headaches. Hematological: Negative. Psychiatric/Behavioral: Negative for sleep disturbance. The patient is not nervous/anxious.         PAST MEDICAL HISTORY    Past Medical History:   Diagnosis Date    Benign prostatic hyperplasia without lower urinary tract symptoms     Colon polyps 12/2017    Diverticulosis large intestine w/o perforation or abscess w/bleeding     Elevated PSA     Dr. Jerrel Romberg follows    GERD (gastroesophageal reflux disease)     Hyperlipidemia        FAMILY HISTORY    Family History   Problem Relation Age of Onset    Diabetes Father     Diabetes Brother        SOCIAL HISTORY    Social History     Socioeconomic History    Marital status:      Spouse name: None    Number of children: 3    Years of education: None    Highest education level: None   Occupational History    Occupation: maintenance   Tobacco Use    Smoking status: Never Smoker    Smokeless tobacco: Never Used   Substance and Sexual Activity    Alcohol use: Yes     Alcohol/week: 2.0 standard drinks     Types: 1 Glasses of wine, 1 Cans of beer per week    Drug use: No    Sexual activity: Yes     Partners: Female   Other Topics Concern    None   Social History Narrative    None     Social Determinants of Health     Financial Resource Strain: Low Risk     Difficulty of Paying Living Expenses: Not hard at all   Food Insecurity: No Food Insecurity    Worried About Running Out of Food in the Last Year: Never true    [place] of Food in the Last Year: Never true   Transportation Needs: No Transportation Needs    Lack of Transportation (Medical): No    Lack of Transportation (Non-Medical):  No   Physical Activity:     Days of Exercise per Week:     Minutes of Exercise per Session:    Stress:     Feeling of Stress :    Social Connections:     Frequency of Communication with Friends and Family:     Frequency of Social Gatherings with Friends and Family:     Attends [employer] Services:     Active Member of Clubs or Organizations:     Attends Club or Organization Meetings:     Marital Status:    Intimate Partner Violence:     Fear of Current or Ex-Partner:     Emotionally Abused:     Physically Abused:     Sexually Abused:        SURGICAL HISTORY    Past Surgical History:   Procedure Laterality Date    COLONOSCOPY  2017    KNEE ARTHROPLASTY Right        CURRENT MEDICATIONS    Current Outpatient Medications   Medication Sig Dispense Refill    atorvastatin (LIPITOR) 10 MG tablet Take 1 tablet by mouth daily 90 tablet 1    omeprazole (PRILOSEC) 20 MG delayed release capsule Take 1 capsule by mouth daily 90 capsule 3    sildenafil (REVATIO) 20 MG tablet Take 1 tablet by mouth daily as needed (ED) 10 tablet 3     No current facility-administered medications for this visit. ALLERGIES    No Known Allergies    PHYSICAL EXAM   Physical Exam  Vitals reviewed. Constitutional:       Appearance: He is well-developed. HENT:      Head: Normocephalic. Eyes:      Conjunctiva/sclera: Conjunctivae normal.      Pupils: Pupils are equal, round, and reactive to light. Neck:      Thyroid: No thyromegaly. Cardiovascular:      Rate and Rhythm: Normal rate and regular rhythm. Heart sounds: Normal heart sounds. No murmur heard. Pulmonary:      Effort: Pulmonary effort is normal.      Breath sounds: Normal breath sounds. No wheezing or rales. Abdominal:      Palpations: Abdomen is soft. Tenderness: There is no abdominal tenderness. There is no guarding or rebound. Musculoskeletal:         General: No tenderness or deformity. Normal range of motion. Cervical back: Normal range of motion and neck supple. Lymphadenopathy:      Cervical: No cervical adenopathy. Skin:     General: Skin is warm and dry. Neurological:      Mental Status: He is alert and oriented to person, place, and time. Psychiatric:         Mood and Affect: Mood normal.         Behavior: Behavior normal.         Thought Content: Thought content normal.         Judgment: Judgment normal.         ASSESSMENT/PLAN  1. Male erectile disorder  Trial of med. Discussed potential side effects.    - sildenafil (REVATIO) 20 MG

## 2021-05-25 NOTE — PATIENT INSTRUCTIONS
Patient Education        Aishwarya Subramanian eréctil: Instrucciones de cuidado  Erectile Dysfunction: Care Instructions  Instrucciones de cuidado    Un hombre tiene disfunción eréctil (ED, por alistair siglas en inglés) cuando no puede tener o mantener de [employer] regular ramón erección que le permita tener relaciones sexuales satisfactorias. Es posible que no pueda tener ramón erección en absoluto. O puede no lograr ramón que sea lo suficientemente firme o duradera para completar el coito. La ED no es lo mismo que tener dificultades para tener ramón erección de vez en cuando. [place] es común y le sucede a la mayoría de los hombres en algún momento. La ED puede ser causada por problemas de los vasos sanguíneos, los nervios o las hormonas. Puede ser causada por diabetes, enfermedad cardíaca y lesiones. Los trastornos [facility], socorro la esclerosis múltiple o la enfermedad de Parkinson, también pueden ser causas. La ED además puede ser causada por medicamentos, alcohol y tabaco. O puede tener causas socorro depresión, estrés, tristeza o [employer]. La atención de seguimiento es ramón parte clave de ellsworth tratamiento y seguridad. Asegúrese de hacer y acudir a todas las citas, y llame a ellsworth médico si está teniendo problemas. También es ramón buena idea saber los resultados de alistair exámenes y mantener ramón lista de los medicamentos que sy. ¿Cómo puede cuidarse en el hogar? Estilo de terry    · Limite el alcohol. No tome más de 2 bebidas al día.     · No fume. Fumar hace que sea más difícil que los vasos sanguíneos del pene se relajen para permitir la entrada de [place]. Si necesita ayuda para dejar de fumar, hable con ellsworth médico sobre programas y medicamentos para dejar de fumar. Estos pueden aumentar alistair probabilidades de dejar el hábito para siempre.     · No use cocaína, heroína ni otras drogas ilegales.     · Intente reducir el estrés.     · Dese tiempo para ajustarse a los [place].  Los [employer], ellsworth faisal, alistair relaciones, ellsworth terry doméstica y otros aspectos pueden causar estrés. Y el estrés puede causar problemas de erección. Colabore con ellsworth lico    · No suponga que sabe lo que le gusta a ellsworth lico en cuanto al sexo. Puede estar equivocado. Hablen sobre lo que cada heather disfruta o no.     · Tómense tiempo fuera del dormitorio para hablar sobre ellsworth terry sexual. Si jeffrey el sexo por temor a los problemas de erección, ellsworth lico puede preocuparse y pensar que ya no tiene interés.     · Si usted y ellsworth lico tienen problemas para hablar de sexo, acudan a un terapeuta que les puede ayudar a hablar sobre el scout. Leer libros con ellsworth lico sobre justine sexual también podría ayudar.     · Relájese. Tómese tiempo con los juegos previos. Preocuparse por alistair erecciones puede hacer que las cosas empeoren. Medicamentos    · Informe a ellsworth médico sobre todos los [place] sy. ? Algunos medicamentos pueden causar problemas de erección. ? Algunos medicamentos pueden tener interacciones peligrosas con los medicamentos recetados para la ED, incluidos los medicamentos de venta jaleesa y los productos herbarios.     · Sea mary con los medicamentos. [place] los medicamentos exactamente socorro se los recetaron. Llame a ellsworth médico si caity que está teniendo un problema con alistair medicamentos.     · Hable con ellsworth médico acerca de probar un medicamento para ayudarle a mantener la erección. Puede ser un medicamento socorro Cialis, Levitra o Viagra. Si tiene un problema cardíaco, pregúntele a ellsworth médico si son seguros para usted. No tome estos medicamentos si [place] nitroglicerina u otros medicamentos que contengan nitratos. ¿Cuándo debe pedir ayuda? Llame a ellsworth médico ahora mismo o busque atención médica inmediata si:    · Tiene ramón erección que dura más de 3 horas.     · Ha tomado un medicamento para mejorar la erección (socorro Cialis, Levitra o Viagra) en las últimas 24 horas y tiene síntomas de angina, socorro dolor o presión en el pecho.  No tome nitroglicerina.     · Tiene problemas de erección junto con dolor o dificultad para orinar, fiebre o dolor en la parte baja del abdomen. Preste especial atención a los cambios en ellsworth justine y asegúrese de comunicarse con ellsworth médico si tiene algún problema. ¿Dónde puede encontrar más información en inglés? Celesta Sers a [email or web address] e ingrese a ellsworth cuenta de MyChart. Eda Joyce [id number] en el [place] \"Search Health Information\" para más información (en inglés) sobre \"Disfunción eréctil: Instrucciones de cuidado. \"     Si no tiene ramón cuenta, jelena home en el enlace \"Sign Up Now\". Revisado: 11 febrero, 2020               Versión del contenido: 12.8  © 0530-1395 Healthwise, Incorporated. Las instrucciones de cuidado fueron adaptadas bajo licencia por [facility] ([facility]). Si usted tiene [place] afección médica o sobre estas instrucciones, siempre pregunte a ellsworth profesional de justine. Healthwise, Incorporated niega toda garantía o responsabilidad por ellsworth uso de esta información. Patient Education        sildenafil (oral)  Silvina:  Revatio, Viagra  ¿Cuál es la información más importante que marisela saber sobre sildenafil oral?  Algunas medicinas pueden causar efectos no deseados o peligrosos cuando se usan con sildenafil. Dígale a ellsworth médico todas las medicinas que [place], especialmente riociguat ([place]). No tome sildenafil si usted [employer] un nitrato para el dolor de pecho o problemas del corazón, incluyendo nitroglycerin, isosorbide dinitrate, isosorbide mononitrate, y [address] de las drogas de diversión, socorro \"poppers\". Agustín sildenafil con un nitrato puede causar ramón disminución repentina y grave en la presión arterial.  Comuníquese con ellsworth médico o busque atención médica de emergencia si ellsworth erección es dolorosa o dura más de 4 horas. La erección prolongada (priapismo) puede hacerle daño al pene.   Deje de usar sildenafil y busque atención médica de emergencia si usted drake perdido [place] de forma repentina. ¿Qué es sildenafil? Sildenafil relaja los músculos de los vasos sanguíneos y [place] el flujo de braulio a regiones particulares del cuerpo. Sildenafil bajo el nombre de Viagra se [place] en el tratamiento de la disfunción eréctil (impotencia) en los hombres. Otra izzy de sildenafil es Revatio, la cual se [place] en el tratamiento de la hipertensión arterial pulmonar y mejora la capacidad de ejercicio en hombres y [facility]. No tome Viagra mientras también sy Revatio, nikia que ellsworth médico le haya indicado [place]. Sildenafil puede también usarse para fines no mencionados en esta guía del medicamento. ¿Qué debería discutir con el profesional del cuidado de la justine antes de agustín sildenafil oral?  Usted no debe usar sildenafil si es alérgico a éste, o:  · si sy otros medicamentos para tratar la hipertensión arterial pulmonar, socorro riociguat ([place]). No tome sildenafil si usted [employer] un nitrato para el dolor de pecho o problemas del corazón. Estos incluyen nitroglycerin, isosorbide dinitrate, y isosorbide mononitrate. Los nitratos también se encuentran en algunas de las drogas de diversión, socorro amyl nitrate o nitritos (\"poppers\").  Agustín sildenafil con un nitrato puede causar ramón disminución repentina y grave en la presión arterial.  Dígale a ellsworth médico si alguna vez ha tenido:  · problemas cardíacos (dolor de pecho, un trastorno del ritmo cardíaco, un ataque al corazón);  · presión arterial solis o baja;  · problemas de la circulación sanguínea;  · retinitis pigmentosa (condición heredada del xander);  · ceguera en heather o ambos ojos;  · problemas de sangrado;  · úlcera del estómago;  · enfermedad venoclusiva pulmonar (EVOP);  · enfermedad del hígado o riñón;  · un trastorno de células de la braulio, socorro anemia falciforme, mieloma múltiple, o leucemia;  · ramón deformidad física del pene (socorro enfermedad de [place]); o  · si le alvarez dicho que no tenga actividad del sexo por razones de justine.  Sildenafil puede disminuir el flujo de braulio al nervio óptico del xander, causando pérdida de la visión de formar súbita. [place] ha sucedido en un número pequeño de personas que tomaron sildenafil, de las cuales la mayoría también tenían enfermedad del corazón, diabetes, presión arterial solis, colesterol elevado, o algunos problemas preexistentes del xander, y en personas que fumaban o tenían más de 48 años. No se entiende con claridad si sildenafil es la causa de pérdida de la visión. Dígale a ellsworth médico si usted [employer]. No se conoce si esta medicina causará daño al bebé yumiko. Sin embargo, tener hipertensión arterial pulmonar polina el embarazo puede causar complicaciones socorro fallo cardíaco, accidente cerebrovascular, o problemas médicos en la madre y el bebé. El beneficio de tratar la hipertensión arterial pulmonar con Revatio puede ser mayor que cualquier riesgo para el bebé. Es posible que no sea seguro amamantar mientras está usando esta [place]. Pregúntele a ellsworth médico sobre cualquier riesgo. No le dé alin medicamento a personas menores de 18 años sin consejo médico.  ¿Cómo marisela ulises sildenafil oral?  Siga todas las instrucciones en la etiqueta de ellsworth prescripción y javier todas las guías del medicamento o las hojas de instrucción. Use la medicina exactamente socorro indicado. Revatio  por lo general se sy zachary veces al día, con un espacio de 4 a 6 horas entre dosis. Viagra  por lo general se sy solamente cuando se necesita, 30 minutos a 1 hora antes Amita Baan [address]. La puede ulises [employer] 4 horas antes de la actividad del [place]. No tome Viagra más de ramón vez al día. Agite la suspensión oral (líquida) antes de [employer] dosis. Use la jeringa de medición que viene con ellsworth medicina, o use un dispositivo para la medición de dosis (no ramón cuchara casera). Viagra  puede ayudarlo a tener ramón erección cuando tenga estimulación sexual. La erección no ocurrirá solamente porque sy la pastilla. Siga las instrucciones de [facility].  Si polina la actividad del sexo, usted nota Zoraida, Maikel, o tiene dolor, entumecimiento, o cosquilleo en ellsworth pecho, brazos, [place], o Calli Flavors, pare de hacer la [place] y llame de inmediato a ellsworth Jodeen Kail ocurriendo un efecto secundario de gravedad que causa sildenafil. Guarde a temperatura ambiente lejos de la humedad y calor. ¿Qué sucede si me khari ramón dosis? Ya que Viagra  se [place] cuando se necesita, no se catiy probable que usted [employer] dosis. Se adalid de ulises ramón dosis de Revatio, tome la medicina tan pronto pueda, abraham sáltese la dosis que dejó de ulises si ya león es hora para la próxima dosis. No tome dos dosis a la vez. Simonemichelle Lemon en ramón sobredosis? Busque atención médica de emergencia o llame a la línea de [employer] al [phone].  ¿Qué marisela evitar mientras ronen sildenafil oral?  Ulises alcohol con esta medicina puede causar efectos secundarios. La toronja puede interactuar con sildenafil y resultar en efectos secundarios no deseados. Evite el uso de productos de [place]. Evite el uso de otras medicinas para el tratamiento de la impotencia, socorro alprostadil o yohimbine, sin sulaiman hablar con ellsworth médico.  ¿Cuáles son los efectos secundarios posibles de sildenafil oral?  Busque atención médica de emergencia si usted tiene signos de [place] reacción alérgica: ronchas; dificultad para respirar; hinchazón de ellsworth mikael, labios, lengua, o garganta. Deje de usar sildenafil y busque atención medica de emergencia si tiene:   · síntomas de un ataque al corazón -- dolor o presión en el pecho, dolor que se extiende a la mandíbula u hombro, náusea, sudoración;  · cambios en ellsworth visión o pérdida súbita de la visión; o  · ramón erección dolorosa o que dura más de 4 horas (la erección prolongada puede hacerle daño al pene).   Llame a ellsworth médico de inmediato si usted tiene:  · falta severa de aire al respirar, tos con moco espumoso;  · zumbido en alistair oídos, o pérdida súbita del oír;  · latido cardíaco irregular;  · hinchazón de alistair elina, tobillos, o pies;  · un convulsión; o  · sensación de desvanecimiento, socorro que se va a desmayar. Efectos secundarios comunes pueden incluir:  · sofocos (sensación súbita de calor, enrojecimiento, u hormigueo);  · dolor de magdalena, mareo;  · visión anormal (visión borrosa, cambios en la visión de colores);  · nariz mocos o congestionada, sangrar por la nariz;  · problemas para dormir (insomnio);  · dolor muscular, dolor de espalda; o  · malestar estomacal.  Esta lista no menciona todos los efectos secundarios y puede ser que ocurran otros. Llame a ellsworth médico para consejos médicos relacionados a efectos secundarios. Usted puede reportar efectos secundarios llamando al FDA al [phone]. ¿Qué otras drogas afectarán a sildenafil oral?  No tome sildenafil con medicinas similares socorro avanafil Margurite Mcgrath), tadalafil (Cialis) o vardenafil (Levitra). Dígale a ellsworth médico acerca de todas las medicinas que usted use para la disfunción eréctil. Dígale a ellsworth médico todas alistair otras medicinas, especialmente:  · drogas para el tratamiento de la presión arterial solis o para un trastorno de la próstata;  · medicina antifúngica --ketoconazole o itraconazole; o  · medicina para tratar el VIH o SIDA --ritonavir y [place]. Esta lista no está completa. Otras drogas pueden afectar a sildenafil, incluyendo medicinas que se obtienen con o sin receta, vitaminas, y productos herbarios. No todas las interacciones posibles se enumeran aquí. ¿Dónde puedo obtener más información? Ellsworth farmacéutico le puede mai más información acerca de sildenafil. Recuerde, [place] y todas las otras medicinas fuera del alcance de los niños, no comparta nunca alistair medicinas con otros, y use [employer] sólo para la condición por la que fue recetada.    Se drake hecho todo lo posible para que la información que proviene de [place] sea precisa, actual, y completa, abraham no se hace garantía de karyn. La información sobre el medicamento incluida aquí puede tener nuevas recomendaciones. La información preparada por [employer] se ha creado para uso del profesional de la justine y para el consumidor en los Estados [facility] ([place] y por lo cual [employer] no certifica que el uso fuera de los .[place]. sea apropiado, a menos que se mencione específicamente lo cual. La información de [employer] sobre drogas no sanciona drogas, ni diagnóstica al paciente o recomienda terapia. La información de [employer] sobre drogas sirve socorro ramón hope de información diseñada para la ayuda del profesional de la justine licenciado en el cuidado de alistair pacientes y/o para servir al consumidor que reciba alin servicio socorro un suplemento a, y no socorro sustituto de la competencia, experiencia, conocimiento y opinión del profesional de la justine. La ausencia en éste de ramón advertencia para ramón droga o combinación de drogas no debe, de ninguna forma, interpretarse socorro que la droga o la combinación de drogas george seguras, [place], o apropiadas para cualquier paciente. [employer] no se responsabiliza por ningún aspecto del cuidado médico que reciba con la ayuda de la información que proviene de [place] lorrie. La información incluida aquí no se ha creado con la intención de cubrir todos los usos posibles, instrucciones, precauciones, advertencias, interacciones con otras drogas, reacciones alérgicas, o efectos secundarios. Si usted tiene alguna pregunta acerca de las drogas que está tomando, consulte con ellsworth médico, [place], o farmacéutico.  Copyright 5490-7015 [facility] HospitalD[facility], Inc. Version: 13.01. Revision date: 12/11/2018. Las instrucciones de cuidado fueron adaptadas bajo licencia por [facility] Si usted tiene [place] afección médica o sobre estas instrucciones, siempre pregunte a ellsworth profesional de justine. [facility], Incorporated niega toda garantía o responsabilidad por ellsworth uso de esta información.

## 2021-09-03 DIAGNOSIS — M25.512 LEFT SHOULDER PAIN, UNSPECIFIED CHRONICITY: Primary | ICD-10-CM

## 2021-09-07 ENCOUNTER — OFFICE VISIT (OUTPATIENT)
Dept: ORTHOPEDIC SURGERY | Age: 71
End: 2021-09-07
Payer: COMMERCIAL

## 2021-09-07 VITALS
RESPIRATION RATE: 14 BRPM | HEART RATE: 61 BPM | WEIGHT: 173 LBS | BODY MASS INDEX: 27.8 KG/M2 | HEIGHT: 66 IN | DIASTOLIC BLOOD PRESSURE: 80 MMHG | SYSTOLIC BLOOD PRESSURE: 130 MMHG

## 2021-09-07 DIAGNOSIS — M75.102 ROTATOR CUFF SYNDROME OF LEFT SHOULDER: Primary | ICD-10-CM

## 2021-09-07 DIAGNOSIS — M75.22 BICEPS TENDINITIS OF LEFT SHOULDER: ICD-10-CM

## 2021-09-07 PROCEDURE — 99214 OFFICE O/P EST MOD 30 MIN: CPT | Performed by: PHYSICIAN ASSISTANT

## 2021-09-07 PROCEDURE — 20611 DRAIN/INJ JOINT/BURSA W/US: CPT | Performed by: PHYSICIAN ASSISTANT

## 2021-09-07 RX ORDER — LIDOCAINE HYDROCHLORIDE 10 MG/ML
2 INJECTION, SOLUTION INFILTRATION; PERINEURAL ONCE
Status: COMPLETED | OUTPATIENT
Start: 2021-09-07 | End: 2021-09-07

## 2021-09-07 RX ORDER — METHYLPREDNISOLONE ACETATE 80 MG/ML
80 INJECTION, SUSPENSION INTRA-ARTICULAR; INTRALESIONAL; INTRAMUSCULAR; SOFT TISSUE ONCE
Status: COMPLETED | OUTPATIENT
Start: 2021-09-07 | End: 2021-09-07

## 2021-09-07 RX ADMIN — METHYLPREDNISOLONE ACETATE 80 MG: 80 INJECTION, SUSPENSION INTRA-ARTICULAR; INTRALESIONAL; INTRAMUSCULAR; SOFT TISSUE at 12:05

## 2021-09-07 RX ADMIN — LIDOCAINE HYDROCHLORIDE 2 ML: 10 INJECTION, SOLUTION INFILTRATION; PERINEURAL at 12:05

## 2021-09-07 ASSESSMENT — ENCOUNTER SYMPTOMS
VOMITING: 0
ABDOMINAL PAIN: 0
NAUSEA: 0
COLOR CHANGE: 0
RESPIRATORY NEGATIVE: 1
CHEST TIGHTNESS: 0
COUGH: 0
APNEA: 0
CONSTIPATION: 0
DIARRHEA: 0
ABDOMINAL DISTENTION: 0
SHORTNESS OF BREATH: 0

## 2021-09-07 NOTE — PATIENT INSTRUCTIONS
INJECTION CARE    The injection site should never get red, hot, or swollen and if it does the patient will contact our office right away. With a cortisone steroid injection, the patient may experience a increase in soreness the first 24-48 hours due to a cortisone flair and can take anti-inflammatories for a short period of time to reduce that soreness. With a lubrication injection, on rare occasion the patient may develop swelling and pain if having a reaction to the medication. If this happens, try an anti-inflammatory medication and ice as needed. If pain and swelling continues, call the office for further instructions. The patient should not submerge the injection site in water for a minimum of 24 hours to avoid infection. This means no lakes, pools, ponds, or hot tubs for 24 hours. If the patient is diabetic the injection may increase their blood sugar for up to one week. The patient can do this cortisone injection once every 3 months as needed and lubrication injections every 6 months. Patient Education        Rotator Cuff: Exercises  Introduction  Here are some examples of exercises for you to try. The exercises may be suggested for a condition or for rehabilitation. Start each exercise slowly. Ease off the exercises if you start to have pain. You will be told when to start these exercises and which ones will work best for you. How to do the exercises  Pendulum swing   If you have pain in your back, do not do this exercise. 1. Hold on to a table or the back of a chair with your good arm. Then bend forward a little and let your sore arm hang straight down. This exercise does not use the arm muscles. Rather, use your legs and your hips to create movement that makes your arm swing freely. 2. Use the movement from your hips and legs to guide the slightly swinging arm back and forth like a pendulum (or elephant trunk). Then guide it in circles that start small (about the size of a dinner plate).  Make the circles a bit larger each day, as your pain allows. 3. Do this exercise for 5 minutes, 5 to 7 times each day. 4. As you have less pain, try bending over a little farther to do this exercise. This will increase the amount of movement at your shoulder. Posterior stretching exercise   1. Hold the elbow of your injured arm with your other hand. 2. Use your hand to pull your injured arm gently up and across your body. You will feel a gentle stretch across the back of your injured shoulder. 3. Hold for at least 15 to 30 seconds. Then slowly lower your arm. 4. Repeat 2 to 4 times. Up-the-back stretch   Your doctor or physical therapist may want you to wait to do this stretch until you have regained most of your range of motion and strength. You can do this stretch in different ways. Hold any of these stretches for at least 15 to 30 seconds. Repeat them 2 to 4 times. 1. Light stretch: Put your hand in your back pocket. Let it rest there to stretch your shoulder. 2. Moderate stretch: With your other hand, hold your injured arm (palm outward) behind your back by the wrist. Pull your arm up gently to stretch your shoulder. 3. Advanced stretch: Put a towel over your other shoulder. Put the hand of your injured arm behind your back. Now hold the back end of the towel. With the other hand, hold the front end of the towel in front of your body. Pull gently on the front end of the towel. This will bring your hand farther up your back to stretch your shoulder. Overhead stretch   1. Standing about an arm's length away, grasp onto a solid surface. You could use a countertop, a doorknob, or the back of a sturdy chair. 2. With your knees slightly bent, bend forward with your arms straight. Lower your upper body, and let your shoulders stretch. 3. As your shoulders are able to stretch farther, you may need to take a step or two backward. 4. Hold for at least 15 to 30 seconds. Then stand up and relax.  If you had stepped back during your stretch, step forward so you can keep your hands on the solid surface. 5. Repeat 2 to 4 times. Shoulder flexion (lying down)   To make a wand for this exercise, use a piece of PVC pipe or a broom handle with the broom removed. Make the wand about a foot wider than your shoulders. 1. Lie on your back, holding a wand with both hands. Your palms should face down as you hold the wand. 2. Keeping your elbows straight, slowly raise your arms over your head. Raise them until you feel a stretch in your shoulders, upper back, and chest.  3. Hold for 15 to 30 seconds. 4. Repeat 2 to 4 times. Shoulder rotation (lying down)   To make a wand for this exercise, use a piece of PVC pipe or a broom handle with the broom removed. Make the wand about a foot wider than your shoulders. 1. Lie on your back. Hold a wand with both hands with your elbows bent and palms up. 2. Keep your elbows close to your body, and move the wand across your body toward the sore arm. 3. Hold for 8 to 12 seconds. 4. Repeat 2 to 4 times. Wall climbing (to the side)   Avoid any movement that is straight to your side, and be careful not to arch your back. Your arm should stay about 30 degrees to the front of your side. 1. Stand with your side to a wall so that your fingers can just touch it at an angle about 30 degrees toward the front of your body. 2. Walk the fingers of your injured arm up the wall as high as pain permits. Try not to shrug your shoulder up toward your ear as you move your arm up. 3. Hold that position for a count of at least 15 to 20.  4. Walk your fingers back down to the starting position. 5. Repeat at least 2 to 4 times. Try to reach higher each time. Wall climbing (to the front)   During this stretching exercise, be careful not to arch your back. 1. Face a wall, and stand so your fingers can just touch it.   2. Keeping your shoulder down, walk the fingers of your injured arm up the wall as high as pain permits. (Don't shrug your shoulder up toward your ear.)  3. Hold your arm in that position for at least 15 to 30 seconds. 4. Slowly walk your fingers back down to where you started. 5. Repeat at least 2 to 4 times. Try to reach higher each time. Shoulder blade squeeze   1. Stand with your arms at your sides, and squeeze your shoulder blades together. Do not raise your shoulders up as you squeeze. 2. Hold 6 seconds. 3. Repeat 8 to 12 times. Scapular exercise: Arm reach   1. Lie flat on your back. This exercise is a very slight motion that starts with your arms raised (elbows straight, arms straight). 2. From this position, reach higher toward the tracy or ceiling. Keep your elbows straight. All motion should be from your shoulder blade only. 3. Relax your arms back to where you started. 4. Repeat 8 to 12 times. Arm raise to the side   During this strengthening exercise, your arm should stay about 30 degrees to the front of your side. 1. Slowly raise your injured arm to the side, with your thumb facing up. Raise your arm 60 degrees at the most (shoulder level is 90 degrees). 2. Hold the position for 3 to 5 seconds. Then lower your arm back to your side. If you need to, bring your \"good\" arm across your body and place it under the elbow as you lower your injured arm. Use your good arm to keep your injured arm from dropping down too fast.  3. Repeat 8 to 12 times. 4. When you first start out, don't hold any extra weight in your hand. As you get stronger, you may use a 1-pound to 2-pound dumbbell or a small can of food. Shoulder flexor and extensor exercise   These are isometric exercises. That means you contract your muscles without actually moving. 1. Push forward (flex): Stand facing a wall or doorjamb, about 6 inches or less back. Hold your injured arm against your body. Make a closed fist with your thumb on top.  Then gently push your hand forward into the wall with about 25% to 50% of your strength. Don't let your body move backward as you push. Hold for about 6 seconds. Relax for a few seconds. Repeat 8 to 12 times. 2. Push backward (extend): Stand with your back flat against a wall. Your upper arm should be against the wall, with your elbow bent 90 degrees (your hand straight ahead). Push your elbow gently back against the wall with about 25% to 50% of your strength. Don't let your body move forward as you push. Hold for about 6 seconds. Relax for a few seconds. Repeat 8 to 12 times. Scapular exercise: Wall push-ups   This exercise is best done with your fingers somewhat turned out, rather than straight up and down. 1. Stand facing a wall, about 12 inches to 18 inches away. 2. Place your hands on the wall at shoulder height. 3. Slowly bend your elbows and bring your face to the wall. Keep your back and hips straight. 4. Push back to where you started. 5. Repeat 8 to 12 times. 6. When you can do this exercise against a wall comfortably, you can try it against a counter. You can then slowly progress to the end of a couch, then to a sturdy chair, and finally to the floor. Scapular exercise: Retraction   For this exercise, you will need elastic exercise material, such as surgical tubing or Thera-Band. 1. Put the band around a solid object at about waist level. (A bedpost will work well.) Each hand should hold an end of the band. 2. With your elbows at your sides and bent to 90 degrees, pull the band back. Your shoulder blades should move toward each other. Then move your arms back where you started. 3. Repeat 8 to 12 times. 4. If you have good range of motion in your shoulders, try this exercise with your arms lifted out to the sides. Keep your elbows at a 90-degree angle. Raise the elastic band up to about shoulder level. Pull the band back to move your shoulder blades toward each other. Then move your arms back where you started.     Internal rotator strengthening exercise 1. Start by tying a piece of elastic exercise material to a doorknob. You can use surgical tubing or Thera-Band. 2. Stand or sit with your shoulder relaxed and your elbow bent 90 degrees. Your upper arm should rest comfortably against your side. Squeeze a rolled towel between your elbow and your body for comfort. This will help keep your arm at your side. 3. Hold one end of the elastic band in the hand of the painful arm. 4. Slowly rotate your forearm toward your body until it touches your belly. Slowly move it back to where you started. 5. Keep your elbow and upper arm firmly tucked against the towel roll or at your side. 6. Repeat 8 to 12 times. External rotator strengthening exercise   1. Start by tying a piece of elastic exercise material to a doorknob. You can use surgical tubing or Thera-Band. (You may also hold one end of the band in each hand.)  2. Stand or sit with your shoulder relaxed and your elbow bent 90 degrees. Your upper arm should rest comfortably against your side. Squeeze a rolled towel between your elbow and your body for comfort. This will help keep your arm at your side. 3. Hold one end of the elastic band with the hand of the painful arm. 4. Start with your forearm across your belly. Slowly rotate the forearm out away from your body. Keep your elbow and upper arm tucked against the towel roll or the side of your body until you begin to feel tightness in your shoulder. Slowly move your arm back to where you started. 5. Repeat 8 to 12 times. Follow-up care is a key part of your treatment and safety. Be sure to make and go to all appointments, and call your doctor if you are having problems. It's also a good idea to know your test results and keep a list of the medicines you take. Where can you learn more? Go to [email or web address] org and sign in to your [employer] account.  Enter Andrzej Chong in the [employer] box to learn more about \"Rotator Cuff: Exercises. \"     If you do not have an account, please click on the \"Sign Up Now\" link. Current as of: November 16, 2020               Content Version: 12.9  © 2006-2021 [employer]. Care instructions adapted under license by [facility] ([facility]). If you have questions about a medical condition or this instruction, always ask your healthcare professional. [address] any warranty or liability for your use of this information.

## 2021-09-07 NOTE — PROGRESS NOTES
[facility] [address] [facility]  [address]  [address] [address]  Dept: [phone]  Dept Fax: [phone]        Left Shoulder - Follow Up     Chief Complaint:     Chief Complaint   Patient presents with    Shoulder Pain     Left Shoulder Pain     HPI:     Martell marquez 73y.o. year old ambidextrous hand dominant male that has had pain in the left shoulder for 4 months. He was seen in the summer 2019 for the same shoulder. He was great for a long time after his last shot. The pain is worse at night and when doing overhead activities. Weakness of the shoulder has been noted. The pain restricts activities such as getting undressed, lifting objects above the head and reaching above the head and washing his hair. The pain does not seem to improve with time. The following medications have been tried: Tylenol and Ibuprofen. The patient has had a corticosteroid injection on 06/13/2019 into the subacromial bursa with mild pain relief. He then also had a bicep tendon sheath injection on 8/26/2019 with excellent relief. The patient has tried physical therapy and he goes twice a week. The patient has not had surgery. Neck pain has not been present.  The patient had an MRI of his shoulder in 2019. Review of Systems   Constitutional: Positive for activity change. Negative for appetite change, fatigue and fever. Respiratory: Negative. Negative for apnea, cough, chest tightness and shortness of breath. Cardiovascular: Negative. Negative for chest pain, palpitations and leg swelling. Gastrointestinal: Negative for abdominal distention, abdominal pain, constipation, diarrhea, nausea and vomiting. Genitourinary: Negative for difficulty urinating, dysuria and hematuria. Musculoskeletal: Positive for arthralgias. Negative for gait problem, joint swelling and myalgias. Skin: Negative for color change and rash.    Neurological: Negative for dizziness, weakness, numbness and headaches. Psychiatric/Behavioral: Positive for sleep disturbance. Past Medical History:    Past Medical History:   Diagnosis Date    Benign prostatic hyperplasia without lower urinary tract symptoms     Colon polyps 12/2017    Diverticulosis large intestine w/o perforation or abscess w/bleeding     Elevated PSA     Dr. Karyle Gill follows    GERD (gastroesophageal reflux disease)     Hyperlipidemia        Past Surgical History:    Past Surgical History:   Procedure Laterality Date    COLONOSCOPY  2017    KNEE ARTHROPLASTY Right        CurrentMedications:   Current Outpatient Medications   Medication Sig Dispense Refill    sildenafil (REVATIO) 20 MG tablet Take 1 tablet by mouth daily as needed (ED) 10 tablet 3    atorvastatin (LIPITOR) 10 MG tablet Take 1 tablet by mouth daily 90 tablet 1    omeprazole (PRILOSEC) 20 MG delayed release capsule Take 1 capsule by mouth daily 90 capsule 3     No current facility-administered medications for this visit. Allergies:    Patient has no known allergies. Social History:   Social History     Socioeconomic History    Marital status:      Spouse name: None    Number of children: 3    Years of education: None    Highest education level: None   Occupational History    Occupation: maintenance   Tobacco Use    Smoking status: Never Smoker    Smokeless tobacco: Never Used   Substance and Sexual Activity    Alcohol use:  Yes     Alcohol/week: 2.0 standard drinks     Types: 1 Glasses of wine, 1 Cans of beer per week    Drug use: No    Sexual activity: Yes     Partners: Female   Other Topics Concern    None   Social History Narrative    None     Social Determinants of Health     Financial Resource Strain: Low Risk     Difficulty of Paying Living Expenses: Not hard at all   Food Insecurity: No Food Insecurity    Worried About Running Out of Food in the Last Year: Never true    [address] in the Last Year: Never true   Transportation Needs: No Transportation Needs    Lack of Transportation (Medical): No    Lack of Transportation (Non-Medical): No   Physical Activity:     Days of Exercise per Week:     Minutes of Exercise per Session:    Stress:     Feeling of Stress :    Social Connections:     Frequency of Communication with Friends and Family:     Frequency of Social Gatherings with Friends and Family:     Attends Restorationist Services:     Active Member of Clubs or Organizations:     Attends Club or Organization Meetings:     Marital Status:    Intimate Partner Violence:     Fear of Current or Ex-Partner:     Emotionally Abused:     Physically Abused:     Sexually Abused:        Family History:  Family History   Problem Relation Age of Onset    Diabetes Father     Diabetes Brother        I have reviewed the CC, HPI, ROS, PMH, FHX, Social History, and if not present in this note, I have reviewed in the patient's chart. I agree with the documentation provided by other staff and have reviewed their documentation prior to providing my signature indicating agreement. Vitals:   /80   Pulse 61   Resp 14   Ht 5' 6\" (1.676 m)   Wt 173 lb (78.5 kg)   BMI 27.92 kg/m²  Body mass index is 27.92 kg/m². Physical Examination:     Orthopedics:    GENERAL: Alert and oriented X3 in no acute distress. SKIN: Intact without lesions or ulcerations. NEURO: Musculoskeletal and axillary nerves intact to sensory and motor testing. VASC: Capillary refill is less than 3 seconds. Left Shoulder Exam    GEN: Alert and oriented X 3, in no acute distress. SKIN: Intact without rashes, lesions, or ulcerations. NEURO: Musculoskeletal ans axillary nerves intact to sensory and motor testing. VASC: Cap refill less than than 3 secs.  Negative Adson's test, Negative Elisabet's test.  ROM: 160 degrees of forward elevation, 45 degrees of external rotation in neutral, 90 degrees of external rotation in abduction, internal rotation to T10. STRENGTH: Supraspinatus 4+/5, external rotators 5/5. MUSC: No atrophy, negative subscap lift off or belly press test.  IMP: mild Neer's sign, negative Hawkin's sign, negative Coracoid impingement, no painful arc, no pain with cross body abduction. PALP: no pain over anterolateral acromion, no pain over AC joint, no pain over traps/rhomboids. INST: negative Adrian's test, + Speed's test  Assessment:     1. Rotator cuff syndrome of left shoulder    2. Biceps tendinitis of left shoulder      Procedures:    Procedure: yes    Subacromial Bursa Injection    Location: Left Shoulder  Procedure: I discussed in detail the risks, benefits and complications of an injection which included but are not limited to infection, skin reactions, hot swollen joint and anaphylaxis with the patient. The patient verbalized understanding and gave informed consent for the injection. The skin was prepped with betadine in a sterile fashion. Under these sterile conditions, a [employer] ultrasound unit with a variable frequency linear transducer was used for precise placement of a 22-gauge needle into the subacromial bursa. A clean technique was utilized using sterile gloves and after prepping the patient under the stated sterile conditions, the patient was placed in the Seated position on the exam table. The posterior soft spot approximately 2 cm distal and 2 cm medial to the posterior acromial edge was identified and marked and a 3 cc solution containing 2 cc of 1% Lidocaine with 1 cc containing 80mg of Depo medrol was injected into the subacromial space with the 22-gauge needle. The needled was withdrawn and the injection site was cleansed. A Band-Aid was placed over the injection site. There was no resistance to the injection and the patient tolerated the procedure well without difficulty.  Adverse reactions of the injection was discussed with the patient including signs of infection, increasing pain, redness, swelling, warmth, fever, chills and the patient was instructed to call immediately with any of these symptoms. Successful needle placement was achieved and final images were taken and saved in the patient's chart for the permanent record. The images are stored on SD card in the machine until downloaded to the patient's chart. Radiology:   XR SHOULDER LEFT (MIN 2 VIEWS)    Result Date: 9/7/2021  LEFT SHOULDER X-RAY  Two views of the left shoulder and 2 views of the scapula, including AP, scapular Y, outlet and axillary views reveal: The glenohumeral joint is well reduced with no osteoarthritic changes. Proximal migration of the humeral head has not occurred. Acromion is a type II-III. The acromioclavicular joint shows mild degenerative changes. Impression: Os Acromiale of the left shoulder     Plan:   Treatment : I reviewed the X-ray with the patient and I informed them that the x-ray shows no significant changes from 2019. We discussed the etiologies and natural histories of rotator cuff syndrome on the left with biceps tendinitis. We discussed the various treatment alternatives including anti-inflammatory medications, physical therapy, injections, further imaging studies and as a last result surgery. During today's visit, we discussed that his strength of his rotator cuff was good but he does have significant pain on the outside of his arm with exam.  I would like to try a subacromial injection today and follow-up in 4 to 6 weeks with a bicep tendon injection if he still having pain in the front of his arm. At this time, the patient has opted for a cortisone injection into the left subacromial space to help reduce inflammation and pain. The injection site should never get red, hot, or swollen and if it does the patient will contact our office right away.  The patient may experience a increase in soreness the first 24-48 hours due to a cortisone flair and can take anti-inflammatories for a short period of time to reduce that soreness. The patient should not submerge the injection site in water for a minimum of 24 hours to avoid infection. This means no lakes, pools, ponds, or hot tubs for 24 hours. If the patient is diabetic the injection may increase their blood sugar for up to one week. The patient can do this cortisone injection once every 3 months as needed. If the injections stop working and do not give the patient relief the patient should consider surgical interventions to produce long term relief. . A physical therapy prescription was not given. He has been to physical therapy and remembers his exercises. I will put some exercises after visit summary just as a reminder. If this injection does not relieve his pain he will come back and follow-up with me in 4 weeks for a bicep tendon injection. Orders Placed This Encounter   Medications    methylPREDNISolone acetate (DEPO-MEDROL) injection 80 mg    lidocaine 1 % injection 2 mL       No orders of the defined types were placed in this encounter.           Electronically signed by Fredy Dee PA-C, on 9/7/2021 at 12:21 PM

## 2021-10-05 ENCOUNTER — PROCEDURE VISIT (OUTPATIENT)
Dept: ORTHOPEDIC SURGERY | Age: 71
End: 2021-10-05
Payer: COMMERCIAL

## 2021-10-05 VITALS
DIASTOLIC BLOOD PRESSURE: 86 MMHG | RESPIRATION RATE: 14 BRPM | SYSTOLIC BLOOD PRESSURE: 136 MMHG | HEIGHT: 66 IN | HEART RATE: 59 BPM | WEIGHT: 172 LBS | BODY MASS INDEX: 27.64 KG/M2

## 2021-10-05 DIAGNOSIS — M75.102 ROTATOR CUFF SYNDROME OF LEFT SHOULDER: ICD-10-CM

## 2021-10-05 DIAGNOSIS — M75.22 BICEPS TENDINITIS OF LEFT SHOULDER: Primary | ICD-10-CM

## 2021-10-05 PROCEDURE — 99213 OFFICE O/P EST LOW 20 MIN: CPT | Performed by: PHYSICIAN ASSISTANT

## 2021-10-05 RX ORDER — METHYLPREDNISOLONE ACETATE 80 MG/ML
80 INJECTION, SUSPENSION INTRA-ARTICULAR; INTRALESIONAL; INTRAMUSCULAR; SOFT TISSUE ONCE
Status: DISCONTINUED | OUTPATIENT
Start: 2021-10-05 | End: 2021-10-05

## 2021-10-05 RX ORDER — LIDOCAINE HYDROCHLORIDE 10 MG/ML
2 INJECTION, SOLUTION INFILTRATION; PERINEURAL ONCE
Status: DISCONTINUED | OUTPATIENT
Start: 2021-10-05 | End: 2021-10-05

## 2021-10-05 ASSESSMENT — ENCOUNTER SYMPTOMS
COUGH: 0
VOMITING: 0
NAUSEA: 0
ABDOMINAL PAIN: 0
APNEA: 0
RESPIRATORY NEGATIVE: 1
SHORTNESS OF BREATH: 0
COLOR CHANGE: 0
DIARRHEA: 0
CHEST TIGHTNESS: 0
CONSTIPATION: 0
ABDOMINAL DISTENTION: 0

## 2021-10-05 NOTE — PROGRESS NOTES
[facility] [address] AND SPORTS MEDICINE  [address] [place] [id number]  Dept: [phone]  Dept Fax: [phone]        Bilateral Shoulder - Follow Up     Chief Complaint:     Chief Complaint   Patient presents with    Follow-up     L Shoulder, Bicep Tendon Injection (KATHI 9/7/21)     HPI:     Foster Doing a 73y.o. year old ambidextrous hand dominant male that has had pain in the left shoulder for 5 months. He was seen in the summer 2019 for the same shoulder. He was great for a long time after his last shot. The pain is worse at night and when doing overhead activities. Weakness of the shoulder has been noted. The pain restricts activities such as getting undressed, lifting objects above the head and reaching above the head and washing his hair. The pain does not seem to improve with time. The following medications have been tried: Tylenol and Ibuprofen. The patient has had a corticosteroid injection on 9/7/2021 into the left subacromial bursa with excellent pain relief. He then also had a bicep tendon sheath injection on 8/26/2019 with excellent relief. The patient has tried physical therapy and he goes twice a week. The patient has not had surgery. Neck pain has not been present.  The patient had an MRI of his shoulder in 2019. He states after the injection that I did and the therapy he is 90% better. He states he has minimal to no pain. He does have some pain in the right shoulder once in a while but not enough to consider any type of treatment. He continues to do his exercises that he learned at therapy. Review of Systems   Constitutional: Positive for activity change. Negative for appetite change, fatigue and fever. Respiratory: Negative. Negative for apnea, cough, chest tightness and shortness of breath. Cardiovascular: Negative. Negative for chest pain, palpitations and leg swelling.    Gastrointestinal: Negative for abdominal distention, abdominal pain, constipation, diarrhea, nausea and vomiting. Genitourinary: Negative for difficulty urinating, dysuria and hematuria. Musculoskeletal: Positive for arthralgias. Negative for gait problem, joint swelling and myalgias. Skin: Negative for color change and rash. Neurological: Negative for dizziness, weakness, numbness and headaches. Psychiatric/Behavioral: Negative for sleep disturbance. Past Medical History:    Past Medical History:   Diagnosis Date    Benign prostatic hyperplasia without lower urinary tract symptoms     Colon polyps 12/2017    Diverticulosis large intestine w/o perforation or abscess w/bleeding     Elevated PSA     Dr. Ld Leos follows    GERD (gastroesophageal reflux disease)     Hyperlipidemia        Past Surgical History:    Past Surgical History:   Procedure Laterality Date    COLONOSCOPY  2017    KNEE ARTHROPLASTY Right        CurrentMedications:   Current Outpatient Medications   Medication Sig Dispense Refill    sildenafil (REVATIO) 20 MG tablet Take 1 tablet by mouth daily as needed (ED) 10 tablet 3    atorvastatin (LIPITOR) 10 MG tablet Take 1 tablet by mouth daily 90 tablet 1    omeprazole (PRILOSEC) 20 MG delayed release capsule Take 1 capsule by mouth daily 90 capsule 3     Current Facility-Administered Medications   Medication Dose Route Frequency Provider Last Rate Last Admin    methylPREDNISolone acetate (DEPO-MEDROL) injection 80 mg  80 mg Intra-articular Once Marianne Pizarro PA-C        lidocaine 1 % injection 2 mL  2 mL Intra-articular Once Marianne Pizarro PA-C           Allergies:    Patient has no known allergies.     Social History:   Social History     Socioeconomic History    Marital status:      Spouse name: None    Number of children: 3    Years of education: None    Highest education level: None   Occupational History    Occupation: maintenance   Tobacco Use    Smoking status: Never Smoker    Smokeless tobacco: Never Used   Substance and Sexual Activity    Alcohol use: Yes     Alcohol/week: 2.0 standard drinks     Types: 1 Glasses of wine, 1 Cans of beer per week    Drug use: No    Sexual activity: Yes     Partners: Female   Other Topics Concern    None   Social History Narrative    None     Social Determinants of Health     Financial Resource Strain: Low Risk     Difficulty of Paying Living Expenses: Not hard at all   Food Insecurity: No Food Insecurity    Worried About Running Out of Food in the Last Year: Never true    [place] of Food in the Last Year: Never true   Transportation Needs: No Transportation Needs    Lack of Transportation (Medical): No    Lack of Transportation (Non-Medical): No   Physical Activity:     Days of Exercise per Week:     Minutes of Exercise per Session:    Stress:     Feeling of Stress :    Social Connections:     Frequency of Communication with Friends and Family:     Frequency of Social Gatherings with Friends and Family:     Attends Hindu Services:     Active Member of Clubs or Organizations:     Attends Club or Organization Meetings:     Marital Status:    Intimate Partner Violence:     Fear of Current or Ex-Partner:     Emotionally Abused:     Physically Abused:     Sexually Abused:        Family History:  Family History   Problem Relation Age of Onset    Diabetes Father     Diabetes Brother        I have reviewed the CC, HPI, ROS, PMH, FHX, Social History, and if not present in this note, I have reviewed in the patient's chart. I agree with the documentation provided by other staff and have reviewed their documentation prior to providing my signature indicating agreement. Vitals:   /86   Pulse 59   Resp 14   Ht 5' 6\" (1.676 m)   Wt 172 lb (78 kg)   BMI 27.76 kg/m²  Body mass index is 27.76 kg/m². Physical Examination:     Orthopedics:    GENERAL: Alert and oriented X3 in no acute distress.   SKIN: Intact without lesions or ulcerations. NEURO: Musculoskeletal and axillary nerves intact to sensory and motor testing. VASC: Capillary refill is less than 3 seconds. Bilateral Shoulder Exam    GEN: Alert and oriented X 3, in no acute distress. SKIN: Intact without rashes, lesions, or ulcerations. NEURO: Musculoskeletal ans axillary nerves intact to sensory and motor testing. VASC: Cap refill less than than 3 secs. Negative Adson's test, Negative Elisabet's test.  ROM: 160 degrees of forward elevation, 40 degrees of external rotation in neutral, 90 degrees of external rotation in abduction, internal rotation to T8. STRENGTH: Supraspinatus 4+/5, external rotators 5/5. MUSC: No atrophy, negative subscap lift off or belly press test.  IMP: Negative Neer's sign, negative Hawkin's sign, no coracoid impingement, no painful arc, no pain with cross body abduction. PALP: No pain over anterolateral acromion, no pain over AC joint, no pain over traps/rhomboids. INST: Negative Kamrar's test, negative Speed's test  Assessment:     1. Biceps tendinitis of left shoulder    2. Rotator cuff syndrome of left shoulder      Procedures:    Procedure: no  Radiology:   No results found. Plan:   Treatment : I reviewed the X-ray . We discussed the various treatment alternatives including anti-inflammatory medications, physical therapy, injections, further imaging studies and as a last result surgery. During today's visit, we discussed that he is 90% better after therapy and the last injection. We had discussed doing a bicep tendon injection today if he was still having pain but he has no significant pain. We discussed that if he begins having pain again we know what shot seems to help the most.  If his right shoulder starts to bother him more he can come back and we can consider a injection into his right shoulder.   The earliest we could do another injection is 8 weeks into the left unless he begins having pain in the front of his shoulder and we could try a bicep tendon injection which has been successful for him in the past.  He will follow up as needed. The patient will call the office immediately with any problems. Orders Placed This Encounter   Medications    methylPREDNISolone acetate (DEPO-MEDROL) injection 80 mg    lidocaine 1 % injection 2 mL       No orders of the defined types were placed in this encounter.           Electronically signed by Brandon Umaña PA-C, on 10/5/2021 at 8:38 AM

## 2021-10-06 DIAGNOSIS — E78.2 MIXED HYPERLIPIDEMIA: ICD-10-CM

## 2021-10-07 RX ORDER — ATORVASTATIN CALCIUM 10 MG/1
10 TABLET, FILM COATED ORAL DAILY
Qty: 90 TABLET | Refills: 3 | Status: SHIPPED | OUTPATIENT
Start: 2021-10-07

## 2021-10-25 ENCOUNTER — PROCEDURE VISIT (OUTPATIENT)
Dept: ORTHOPEDIC SURGERY | Age: 71
End: 2021-10-25
Payer: COMMERCIAL

## 2021-10-25 VITALS
HEIGHT: 66 IN | BODY MASS INDEX: 27.97 KG/M2 | SYSTOLIC BLOOD PRESSURE: 131 MMHG | HEART RATE: 62 BPM | RESPIRATION RATE: 13 BRPM | WEIGHT: 174 LBS | DIASTOLIC BLOOD PRESSURE: 82 MMHG

## 2021-10-25 DIAGNOSIS — M75.22 BICEPS TENDINITIS OF LEFT SHOULDER: Primary | ICD-10-CM

## 2021-10-25 PROCEDURE — 20611 DRAIN/INJ JOINT/BURSA W/US: CPT | Performed by: PHYSICIAN ASSISTANT

## 2021-10-25 RX ORDER — LIDOCAINE HYDROCHLORIDE 10 MG/ML
2 INJECTION, SOLUTION INFILTRATION; PERINEURAL ONCE
Status: COMPLETED | OUTPATIENT
Start: 2021-10-25 | End: 2021-10-25

## 2021-10-25 RX ORDER — METHYLPREDNISOLONE ACETATE 80 MG/ML
80 INJECTION, SUSPENSION INTRA-ARTICULAR; INTRALESIONAL; INTRAMUSCULAR; SOFT TISSUE ONCE
Status: COMPLETED | OUTPATIENT
Start: 2021-10-25 | End: 2021-10-25

## 2021-10-25 RX ADMIN — METHYLPREDNISOLONE ACETATE 80 MG: 80 INJECTION, SUSPENSION INTRA-ARTICULAR; INTRALESIONAL; INTRAMUSCULAR; SOFT TISSUE at 10:19

## 2021-10-25 RX ADMIN — LIDOCAINE HYDROCHLORIDE 2 ML: 10 INJECTION, SOLUTION INFILTRATION; PERINEURAL at 10:20

## 2021-10-25 NOTE — PROGRESS NOTES
[facility] [address] AND SPORTS MEDICINE  [address] [place] [id number]  Dept: [phone]  Dept Fax: [phone]          Left Shoulder Visit - Follow up    Subjective:     Chief Complaint   Patient presents with    Shoulder Pain     L Shoulder Inj Req (LI 9/7/21)     HPI:     Jt Petty presents today for Left shoulder pain. Patient is here today for cortisone injections into Left shoulder. He had a injection into the subacromial space on 9/7/2021 with excellent relief. He also had a bicep tendon sheath injection on 8/26/2019. He is here today for a bicep tendon injection. I have reviewed the CC, and if not present in this note, I have reviewed in the patient's chart. I agree with the documentation provided by other staff and have reviewed their documentation prior to providing my signature indicating agreement. Vitals:   /82   Pulse 62   Resp 13   Ht 5' 6\" (1.676 m)   Wt 174 lb (78.9 kg)   BMI 28.08 kg/m²  Body mass index is 28.08 kg/m². Assessment:     1. Biceps tendinitis of left shoulder          Procedure:   Procedure: Yes    Bicep Tendon Injection    Location: Left  Procedure: Corticosteroid injection into the left shoulder. The patient was placed in the Supine position on the exam table. The bicep tendon was identified in the bicipital groove and was identified and marked. The skin was prepped with betadine in a sterile fashion. Utilizing a [employer] ultrasound unit with a variable frequency linear transducer for precise placement, then utilizing clean technique with sterile gloves a 3 cc solution containing  2cc of 1% Lidocaine with 1 cc containing 80mg of Depomedrol was injected into the subacromial space. There was no resistance to injection. The wound was cleansed and a Band-Aid was placed. The patient tolerated the procedure without difficulty.  Adverse reactions of the injection was discussed with the patient including signs of infection (increasing pain, redness, swelling) and the patient was instructed to call immediately with any of these symptoms. Successful needle placement was achieved and final images were taken and saved in the patient's chart for the permanent record. The images are stored on SD card in the machine until downloaded to the patient's chart. Plan:   Patient should return to the clinic in 3 months to follow up with Shay Roche PA-C. The patient will call the office immediately with any problems. Orders Placed This Encounter   Medications    lidocaine 1 % injection 2 mL    methylPREDNISolone acetate (DEPO-MEDROL) injection 80 mg       No orders of the defined types were placed in this encounter.           Electronically signed by Evelyne Aragon PA-C, on 10/25/2021 at 10:22 AM

## 2022-05-31 RX ORDER — OMEPRAZOLE 20 MG/1
TABLET, DELAYED RELEASE ORAL
Qty: 90 TABLET | Refills: 3 | Status: SHIPPED | OUTPATIENT
Start: 2022-05-31

## 2022-05-31 NOTE — TELEPHONE ENCOUNTER
Pharm requested    Health Maintenance   Topic Date Due    Hepatitis C screen  Never done    Shingles vaccine (3 of 3) 06/13/2018    Pneumococcal 65+ years Vaccine (2 - PPSV23 or PCV20) 12/12/2019    Diabetes screen  06/08/2021    Lipids  02/05/2022    Depression Screen  02/05/2022    DTaP/Tdap/Td vaccine (1 - Tdap) 08/06/2029 (Originally 4/22/1969)    Colorectal Cancer Screen  12/27/2027    Flu vaccine  Completed    COVID-19 Vaccine  Completed    Hepatitis A vaccine  Aged Out    Hepatitis B vaccine  Aged Out    Hib vaccine  Aged Out    Meningococcal (ACWY) vaccine  Aged Out             (applicable per patient's age: Cancer Screenings, Depression Screening, Fall Risk Screening, Immunizations)    Hemoglobin A1C (%)   Date Value   06/08/2018 5.3     LDL Cholesterol (mg/dL)   Date Value   02/05/2021 141 (H)     AST (U/L)   Date Value   02/05/2021 28     ALT (U/L)   Date Value   02/05/2021 36     BUN (mg/dL)   Date Value   02/05/2021 19      (goal A1C is < 7)   (goal LDL is <100) need 30-50% reduction from baseline     BP Readings from Last 3 Encounters:   10/25/21 131/82   10/05/21 136/86   09/07/21 130/80    (goal /80)      All Future Testing planned in [facility]:  Lab Frequency Next Occurrence       Next Visit Date:  Future Appointments   Date Time Provider Bubba Jack   8/11/2022  8:00 AM [facility]            Patient Active Problem List:     GERD (gastroesophageal reflux disease)     Hyperlipidemia     Diverticulosis large intestine w/o perforation or abscess w/bleeding     Colon polyps     Elevated PSA     Benign prostatic hyperplasia without lower urinary tract symptoms

## 2022-11-15 DIAGNOSIS — M25.552 LEFT HIP PAIN: Primary | ICD-10-CM

## 2022-11-16 ENCOUNTER — OFFICE VISIT (OUTPATIENT)
Dept: ORTHOPEDIC SURGERY | Age: 72
End: 2022-11-16
Payer: COMMERCIAL

## 2022-11-16 VITALS — RESPIRATION RATE: 14 BRPM | HEIGHT: 66 IN | WEIGHT: 175 LBS | BODY MASS INDEX: 28.12 KG/M2

## 2022-11-16 DIAGNOSIS — M25.552 HIP PAIN, BILATERAL: Primary | ICD-10-CM

## 2022-11-16 DIAGNOSIS — M70.62 GREATER TROCHANTERIC BURSITIS OF BOTH HIPS: Primary | ICD-10-CM

## 2022-11-16 DIAGNOSIS — M70.61 GREATER TROCHANTERIC BURSITIS OF BOTH HIPS: Primary | ICD-10-CM

## 2022-11-16 DIAGNOSIS — M25.551 HIP PAIN, BILATERAL: Primary | ICD-10-CM

## 2022-11-16 PROCEDURE — 1123F ACP DISCUSS/DSCN MKR DOCD: CPT | Performed by: PHYSICIAN ASSISTANT

## 2022-11-16 PROCEDURE — 20611 DRAIN/INJ JOINT/BURSA W/US: CPT | Performed by: PHYSICIAN ASSISTANT

## 2022-11-16 PROCEDURE — 99213 OFFICE O/P EST LOW 20 MIN: CPT | Performed by: PHYSICIAN ASSISTANT

## 2022-11-16 RX ORDER — TRIAMCINOLONE ACETONIDE 40 MG/ML
40 INJECTION, SUSPENSION INTRA-ARTICULAR; INTRAMUSCULAR ONCE
Status: COMPLETED | OUTPATIENT
Start: 2022-11-16 | End: 2022-11-16

## 2022-11-16 RX ORDER — LIDOCAINE HYDROCHLORIDE 10 MG/ML
4 INJECTION, SOLUTION INFILTRATION; PERINEURAL ONCE
Status: COMPLETED | OUTPATIENT
Start: 2022-11-16 | End: 2022-11-16

## 2022-11-16 RX ADMIN — TRIAMCINOLONE ACETONIDE 40 MG: 40 INJECTION, SUSPENSION INTRA-ARTICULAR; INTRAMUSCULAR at 13:46

## 2022-11-16 RX ADMIN — TRIAMCINOLONE ACETONIDE 40 MG: 40 INJECTION, SUSPENSION INTRA-ARTICULAR; INTRAMUSCULAR at 13:45

## 2022-11-16 RX ADMIN — LIDOCAINE HYDROCHLORIDE 4 ML: 10 INJECTION, SOLUTION INFILTRATION; PERINEURAL at 13:45

## 2022-11-16 ASSESSMENT — ENCOUNTER SYMPTOMS
NAUSEA: 0
CHEST TIGHTNESS: 0
COLOR CHANGE: 0
RESPIRATORY NEGATIVE: 1
APNEA: 0
ABDOMINAL PAIN: 0
SHORTNESS OF BREATH: 0
CONSTIPATION: 0
COUGH: 0
DIARRHEA: 0
VOMITING: 0
ABDOMINAL DISTENTION: 0

## 2022-11-16 NOTE — PROGRESS NOTES
[address] [place] [address] AND SPORTS MEDICINE  [address] [place] [id number]  Dept: [phone]  Dept Fax: [phone]        Bilateral Hip Office Visit    Subjective:     Chief Complaint   Patient presents with    Hip Pain     L Hip Pain     HPI:     Elva Silva presents with a 2 month history of pain in the bilateral hip, the left hip worse than the right. The pain does not radiate into the thigh and into the groin. The pain is present over the lateral aspect of the hip. He states the worst pain is when he goes from sitting to standing and the first several steps are more painful and that he gets a little better until you sit down again. 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 Night pain, which disturbs the patient`s sleep is a problem. He is able to continue doing his job but it is painful. He has never had any surgeries to his hip. He has never had any from formal treatment of either of his hips. He is not complaining any back pain. ROS:     Review of Systems   Constitutional:  Positive for activity change. Negative for appetite change, fatigue and fever. Respiratory: Negative. Negative for apnea, cough, chest tightness and shortness of breath. Cardiovascular: Negative. Negative for chest pain, palpitations and leg swelling. Gastrointestinal:  Negative for abdominal distention, abdominal pain, constipation, diarrhea, nausea and vomiting. Genitourinary:  Negative for difficulty urinating, dysuria and hematuria. Musculoskeletal:  Positive for arthralgias and gait problem. Negative for joint swelling and myalgias. Skin:  Negative for color change and rash. Neurological:  Negative for dizziness, weakness, numbness and headaches. Psychiatric/Behavioral:  Positive for sleep disturbance.       Past Medical History:    Past Medical History:   Diagnosis Date    Benign prostatic hyperplasia without lower urinary tract symptoms     Colon polyps 12/2017    Diverticulosis large intestine w/o perforation or abscess w/bleeding     Elevated PSA     Dr. Sung He follows    GERD (gastroesophageal reflux disease)     Hyperlipidemia        Past Surgical History:    Past Surgical History:   Procedure Laterality Date    COLONOSCOPY  2017    KNEE ARTHROPLASTY Right        CurrentMedications:   Current Outpatient Medications   Medication Sig Dispense Refill    KLS OMPERAZOLE 20 MG EC tablet TAKE ONE TABLET BY MOUTH ONE TIME DAILY 90 tablet 3    atorvastatin (LIPITOR) 10 MG tablet Take 1 tablet by mouth daily 90 tablet 3    sildenafil (REVATIO) 20 MG tablet Take 1 tablet by mouth daily as needed (ED) 10 tablet 3    omeprazole (PRILOSEC) 20 MG delayed release capsule Take 1 capsule by mouth daily 90 capsule 3     Current Facility-Administered Medications   Medication Dose Route Frequency Provider Last Rate Last Admin    lidocaine 1 % injection 4 mL  4 mL Intra-artICUlar Once Urbanradha Ritchie PA-C        triamcinolone acetonide (KENALOG-40) injection 40 mg  40 mg Intra-artICUlar Once Urban Erma PA-BERTO        triamcinolone acetonide (KENALOG-40) injection 40 mg  40 mg Intra-artICUlar Once Urbanradha Ritchie PA-C           Allergies:    Epinephrine    Social History:   Social History     Socioeconomic History    Marital status:      Spouse name: None    Number of children: 3    Years of education: None    Highest education level: None   Occupational History    Occupation: maintenance   Tobacco Use    Smoking status: Never    Smokeless tobacco: Never   Substance and Sexual Activity    Alcohol use:  Yes     Alcohol/week: 2.0 standard drinks     Types: 1 Glasses of wine, 1 Cans of beer per week    Drug use: No    Sexual activity: Yes     Partners: Female       Family History:  Family History   Problem Relation Age of Onset    Diabetes Father     Diabetes Brother        Vitals:   Resp 14 Ht 5' 6\" (1.676 m)   Wt 175 lb (79.4 kg)   BMI 28.25 kg/m²  Body mass index is 28.25 kg/m². Physical Examination:     Orthopedics:    GENERAL: Alert and oriented X3 in no acute distress. SKIN: Intact without lesions or ulcerations. NEURO: Intact to sensory and motor testing. VASC: Capillary refill is less than 3 seconds. Bilateral Hip     GEN: Alert and oriented X 3, in no acute distress. GAIT: The patient's gait was observed while entering the exam room and was noted to be non antalgic. The extremity is in anatomic alignment. SKIN: Intact without rashes, lesions, or ulcerations. No obvious deformity or swelling. NEURO: The patient responds to light touch throughout bilateral LE. Patellar and Achilles reflexes are 2/4. VASC: The bilateral LE is neurovascularly intact with 2/4 DP and 2/4 PT pulses. Brisk capillary refill. ROM: 0 degree flexion contracture, 100 degrees flexion, 40 degrees abduction, 30 degrees adduction, 30 degrees of internal rotation, 45 degrees of external rotation. MUSC: Strength is 5/5 flexion, abduction, internal rotation, external rotation. PALP: The patient is  tender to palpation over the greater trochanter. TEST: Log roll is negative. No apparent leg length discrepancy. Negative Stenchfield test. + Impingement test. Negative Trendelenburg test. Negative Russel's test. Negative C sign. No labral clunks. Lateral hip pain with FADIR  Assessment:     1. Greater trochanteric bursitis of both hips      Procedures:    Procedure: yes    Greater Trochanteric Bursa Injection     Procedure: Indira Core agreed today for a Corticosteroid injection into the bilateral greater trochanteric bursa. The patient was placed in the lateral position with the affected side up. The point of the maximum tenderness was marked with the closed end of a click type pen. The skin was prepped with betadine in a sterile fashion.  Utilizing a [employer] ultrasound unit with a variable frequency linear transducer and sterile technique a 3 cc solution containing 2cc of 1% lidocaine  and 1 cc containing 40 mg of Kenalog was injected into the greater trochanteric bursa with a 22 gauge spinal needle. The wound was cleansed and a Band-Aid was placed. The patient tolerated the procedure without difficulty. Adverse reactions of the injection was discussed with the patient including signs of infection (increasing pain, redness, swelling) and the patient was instructed to call immediately with any of these symptoms. The images are stored on SD card in the machine until downloaded to the patient's chart. Radiology:   HIP/PELVIS X-RAY    AP and standing AP of the pelvis and supine AP and frog leg lateral of the bilateral hips. Radiographs were obtained today and demonstrate mild joint spacing is within normal limits and visualized articular surfaces are intact. There is no evidence of fracture, dislocation or other significant abnormality. Impression: Negative radiographs of the bilateral hips   Plan:   Treatment : I reviewed the x-ray with the patient and I informed them that the x-ray shows some very minimal degenerative changes but overall was negative. We discussed the etiologies and natural histories of greater trochanteric bursitis bilateral hips. We discussed the various treatment alternatives including anti-inflammatory medications, physical therapy, injections, further imaging studies and as a last result surgery. During today's visit, we discussed that he has bursitis of both hips. The quickest thing we can do today is a cortisone injection into both and then give him exercises that he can do on his own or have him go to physical therapy. The patient states he would like to try exercises on his own. We discussed taking ibuprofen as needed. I explained that the injection could get sore for 24 to 48 hours but most often that does not happen.  The patient has opted for a cortisone injection into the bilateral greater trochanteric bursa to help reduce inflammation and pain. The injection site should never get red, hot, or swollen and if it does the patient will contact our office right away. The patient may experience a increase in soreness the first 24-48 hours due to a cortisone flair and can take anti-inflammatories for a short period of time to reduce that soreness. The patient should not submerge the injection site in water for a minimum of 24 hours to avoid infection. This means no lakes, pools, ponds, or hot tubs for 24 hours. If the patient is diabetic the injection may increase their blood sugar for up to one week. The patient can do this cortisone injection once every 3 months as needed. If the injections stop working and do not give the patient relief the patient should consider surgical interventions to produce long term relief. Patient should return to the clinic as needed if he is not getting better to follow up with  Genet Donnelly PA-C. The patient will call the office immediately with any problems. Orders Placed This Encounter   Medications    lidocaine 1 % injection 4 mL    triamcinolone acetonide (KENALOG-40) injection 40 mg    triamcinolone acetonide (KENALOG-40) injection 40 mg         No orders of the defined types were placed in this encounter. This note is created with the assistance of a speech recognition program.  While intending to generate a document that actually reflects the content of the visit, the document can still have some errors including those of syntax and sound a like substitutions which may escape proof reading.   In such instances, actual meaning can be extrapolated by contextual diversion      Electronically signed by Vlad Mansfield PA-C, on 11/16/2022 at 12:07 PM

## 2022-12-05 ENCOUNTER — TELEPHONE (OUTPATIENT)
Dept: FAMILY MEDICINE CLINIC | Age: 72
End: 2022-12-05

## 2022-12-05 NOTE — TELEPHONE ENCOUNTER
Patient spouse calling in to inform patient had a productive cough with drainage for three days now, his last covid test was back in october and it was positive, they would like for you to send over some antibiotics to take

## 2022-12-12 DIAGNOSIS — E78.2 MIXED HYPERLIPIDEMIA: ICD-10-CM

## 2022-12-12 RX ORDER — ATORVASTATIN CALCIUM 10 MG/1
TABLET, FILM COATED ORAL
Qty: 90 TABLET | Refills: 0 | OUTPATIENT
Start: 2022-12-12

## 2022-12-13 NOTE — TELEPHONE ENCOUNTER
Called pt spoke with his wife, they are going on vacation & will call when they get back to schedule

## 2023-09-01 ENCOUNTER — HOSPITAL ENCOUNTER (OUTPATIENT)
Age: 73
Setting detail: SPECIMEN
Discharge: HOME | End: 2023-09-01

## 2023-09-01 ENCOUNTER — OFFICE VISIT (OUTPATIENT)
Dept: FAMILY MEDICINE CLINIC | Age: 73
End: 2023-09-01
Payer: COMMERCIAL

## 2023-09-01 VITALS
BODY MASS INDEX: 27.41 KG/M2 | WEIGHT: 169.8 LBS | DIASTOLIC BLOOD PRESSURE: 72 MMHG | SYSTOLIC BLOOD PRESSURE: 110 MMHG | HEART RATE: 64 BPM

## 2023-09-01 DIAGNOSIS — Z00.00 ANNUAL PHYSICAL EXAM: Primary | ICD-10-CM

## 2023-09-01 DIAGNOSIS — E78.2 MIXED HYPERLIPIDEMIA: ICD-10-CM

## 2023-09-01 DIAGNOSIS — N40.0 BENIGN PROSTATIC HYPERPLASIA WITHOUT LOWER URINARY TRACT SYMPTOMS: ICD-10-CM

## 2023-09-01 DIAGNOSIS — K21.9 GASTROESOPHAGEAL REFLUX DISEASE WITHOUT ESOPHAGITIS: ICD-10-CM

## 2023-09-01 LAB
ALBUMIN SERPL-MCNC: 4.4 G/DL (ref 3.5–5.2)
ALBUMIN/GLOB SERPL: 1.9 {RATIO} (ref 1–2.5)
ALP SERPL-CCNC: 68 U/L (ref 40–129)
ALT SERPL-CCNC: 26 U/L (ref 5–41)
ANION GAP SERPL CALCULATED.3IONS-SCNC: 12 MMOL/L (ref 9–17)
AST SERPL-CCNC: 21 U/L
BILIRUB SERPL-MCNC: 0.6 MG/DL (ref 0.3–1.2)
BUN SERPL-MCNC: 17 MG/DL (ref 8–23)
CALCIUM SERPL-MCNC: 9.2 MG/DL (ref 8.6–10.4)
CHLORIDE SERPL-SCNC: 107 MMOL/L (ref 98–107)
CHOLEST SERPL-MCNC: 198 MG/DL
CHOLESTEROL/HDL RATIO: 5.8
CO2 SERPL-SCNC: 24 MMOL/L (ref 20–31)
CREAT SERPL-MCNC: 0.9 MG/DL (ref 0.7–1.2)
GFR SERPL CREATININE-BSD FRML MDRD: >60 ML/MIN/1.73M2
GLUCOSE SERPL-MCNC: 105 MG/DL (ref 70–99)
HDLC SERPL-MCNC: 34 MG/DL
LDLC SERPL CALC-MCNC: 139 MG/DL (ref 0–130)
POTASSIUM SERPL-SCNC: 4.2 MMOL/L (ref 3.7–5.3)
PROT SERPL-MCNC: 6.7 G/DL (ref 6.4–8.3)
SODIUM SERPL-SCNC: 143 MMOL/L (ref 135–144)
TRIGL SERPL-MCNC: 126 MG/DL

## 2023-09-01 PROCEDURE — 99397 PER PM REEVAL EST PAT 65+ YR: CPT | Performed by: FAMILY MEDICINE

## 2023-09-01 SDOH — ECONOMIC STABILITY: HOUSING INSECURITY
IN THE LAST 12 MONTHS, WAS THERE A TIME WHEN YOU DID NOT HAVE A STEADY PLACE TO SLEEP OR SLEPT IN A SHELTER (INCLUDING NOW)?: NO

## 2023-09-01 SDOH — ECONOMIC STABILITY: FOOD INSECURITY: WITHIN THE PAST 12 MONTHS, THE FOOD YOU BOUGHT JUST DIDN'T LAST AND YOU DIDN'T HAVE MONEY TO GET MORE.: NEVER TRUE

## 2023-09-01 SDOH — ECONOMIC STABILITY: INCOME INSECURITY: HOW HARD IS IT FOR YOU TO PAY FOR THE VERY BASICS LIKE FOOD, HOUSING, MEDICAL CARE, AND HEATING?: NOT HARD AT ALL

## 2023-09-01 SDOH — ECONOMIC STABILITY: FOOD INSECURITY: WITHIN THE PAST 12 MONTHS, YOU WORRIED THAT YOUR FOOD WOULD RUN OUT BEFORE YOU GOT MONEY TO BUY MORE.: NEVER TRUE

## 2023-09-01 ASSESSMENT — ENCOUNTER SYMPTOMS
CONSTIPATION: 0
SHORTNESS OF BREATH: 0
ALLERGIC/IMMUNOLOGIC NEGATIVE: 1
COUGH: 0
ABDOMINAL PAIN: 0
DIARRHEA: 0
EYES NEGATIVE: 1
BLOOD IN STOOL: 0

## 2023-09-01 ASSESSMENT — PATIENT HEALTH QUESTIONNAIRE - PHQ9
SUM OF ALL RESPONSES TO PHQ QUESTIONS 1-9: 0
2. FEELING DOWN, DEPRESSED OR HOPELESS: 0
SUM OF ALL RESPONSES TO PHQ9 QUESTIONS 1 & 2: 0
1. LITTLE INTEREST OR PLEASURE IN DOING THINGS: 0
SUM OF ALL RESPONSES TO PHQ QUESTIONS 1-9: 0

## 2023-09-01 NOTE — PROGRESS NOTES
[address] [address]  Dept: [phone]    9/1/2023    CHIEF COMPLAINT    Chief Complaint   Patient presents with    Annual Exam       HPI    Sophia Collier is a 72 y.o. male who presents   Chief Complaint   Patient presents with    Annual Exam   .  Physical exam.  History of elevated cholesterol. Has not been taking Lipitor since he had not had labs and ran out of medication. He does follow a healthy diet. He is still working actively at [employer] in [employer] but plans to retire at the end of this year. Vitals:    09/01/23 0807   BP: 110/72   Pulse: 64   Weight: 169 lb 12.8 oz (77 kg)       REVIEW OF SYSTEMS    Review of Systems   Constitutional:  Negative for fatigue, fever and unexpected weight change. HENT: Negative. Eyes: Negative. Respiratory:  Negative for cough and shortness of breath. Cardiovascular:  Negative for chest pain and leg swelling. Gastrointestinal:  Negative for abdominal pain, blood in stool, constipation and diarrhea. Endocrine: Negative. Genitourinary:  Negative for frequency and urgency. Musculoskeletal: Negative. Skin: Negative. Allergic/Immunologic: Negative. Neurological:  Negative for dizziness and headaches. Hematological: Negative. Psychiatric/Behavioral:  Negative for sleep disturbance. The patient is not nervous/anxious.       PAST MEDICAL HISTORY    Past Medical History:   Diagnosis Date    Benign prostatic hyperplasia without lower urinary tract symptoms     Colon polyps 12/2017    Diverticulosis large intestine w/o perforation or abscess w/bleeding     Elevated PSA     Dr. Nae Madrigal follows    GERD (gastroesophageal reflux disease)     Hyperlipidemia        FAMILY HISTORY    Family History   Problem Relation Age of Onset    Diabetes Father     Diabetes Brother        SOCIAL HISTORY    Social History     Socioeconomic History

## 2023-09-05 DIAGNOSIS — E78.2 MIXED HYPERLIPIDEMIA: Primary | ICD-10-CM

## 2023-09-05 RX ORDER — ATORVASTATIN CALCIUM 10 MG/1
10 TABLET, FILM COATED ORAL DAILY
Qty: 90 TABLET | Refills: 1 | Status: SHIPPED | OUTPATIENT
Start: 2023-09-05

## 2023-09-07 DIAGNOSIS — K21.9 GASTROESOPHAGEAL REFLUX DISEASE WITHOUT ESOPHAGITIS: ICD-10-CM

## 2023-09-07 RX ORDER — OMEPRAZOLE 20 MG/1
20 CAPSULE, DELAYED RELEASE ORAL DAILY
Qty: 90 CAPSULE | Refills: 3 | Status: SHIPPED | OUTPATIENT
Start: 2023-09-07

## 2023-09-07 NOTE — TELEPHONE ENCOUNTER
----- Message from Fabiola Lakhani sent at 9/7/2023  2:30 PM EDT -----  Subject: Refill Request    QUESTIONS  Name of Medication? omeprazole (PRILOSEC) 20 MG delayed release capsule  Patient-reported dosage and instructions? take 1 time a day   How many days do you have left? 4  Preferred Pharmacy? [facility] # [id number]  Pharmacy phone number (if available)? [phone]  Additional Information for Provider? Pharmacy asked the patient to call .   ---------------------------------------------------------------------------  --------------  CALL BACK INFO  What is the best way for the office to contact you? OK to leave message on   voicemail  Preferred Call Back Phone Number? [phone]  ---------------------------------------------------------------------------  --------------  SCRIPT ANSWERS  Relationship to Patient? Spouse/Partner  Representative Name? Jatinder Graves  Is the representative on the Communication Release of Information (CLAUDIA)   form in Epic?  Yes

## 2023-09-07 NOTE — TELEPHONE ENCOUNTER
Laurence Gallo is calling to request a refill on the following medication(s):    Last Visit Date (If Applicable):  2/9/3068    Next Visit Date:    9/3/2024    Medication Request:  Requested Prescriptions     Pending Prescriptions Disp Refills    omeprazole (PRILOSEC) 20 MG delayed release capsule 90 capsule 3     Sig: Take 1 capsule by mouth daily

## 2023-10-03 ENCOUNTER — OFFICE VISIT (OUTPATIENT)
Dept: ORTHOPEDIC SURGERY | Age: 73
End: 2023-10-03
Payer: COMMERCIAL

## 2023-10-03 VITALS — WEIGHT: 170 LBS | RESPIRATION RATE: 16 BRPM | HEIGHT: 66 IN | BODY MASS INDEX: 27.32 KG/M2

## 2023-10-03 DIAGNOSIS — M70.61 GREATER TROCHANTERIC BURSITIS OF BOTH HIPS: Primary | ICD-10-CM

## 2023-10-03 DIAGNOSIS — M70.62 GREATER TROCHANTERIC BURSITIS OF BOTH HIPS: Primary | ICD-10-CM

## 2023-10-03 PROCEDURE — 20611 DRAIN/INJ JOINT/BURSA W/US: CPT | Performed by: PHYSICIAN ASSISTANT

## 2023-10-03 PROCEDURE — 1123F ACP DISCUSS/DSCN MKR DOCD: CPT | Performed by: PHYSICIAN ASSISTANT

## 2023-10-03 PROCEDURE — 99213 OFFICE O/P EST LOW 20 MIN: CPT | Performed by: PHYSICIAN ASSISTANT

## 2023-10-03 RX ORDER — LIDOCAINE HYDROCHLORIDE 10 MG/ML
4 INJECTION, SOLUTION INFILTRATION; PERINEURAL ONCE
Status: COMPLETED | OUTPATIENT
Start: 2023-10-03 | End: 2023-10-03

## 2023-10-03 RX ORDER — METHYLPREDNISOLONE ACETATE 80 MG/ML
80 INJECTION, SUSPENSION INTRA-ARTICULAR; INTRALESIONAL; INTRAMUSCULAR; SOFT TISSUE ONCE
Status: COMPLETED | OUTPATIENT
Start: 2023-10-03 | End: 2023-10-03

## 2023-10-03 RX ADMIN — METHYLPREDNISOLONE ACETATE 80 MG: 80 INJECTION, SUSPENSION INTRA-ARTICULAR; INTRALESIONAL; INTRAMUSCULAR; SOFT TISSUE at 13:19

## 2023-10-03 RX ADMIN — LIDOCAINE HYDROCHLORIDE 4 ML: 10 INJECTION, SOLUTION INFILTRATION; PERINEURAL at 13:19

## 2023-10-03 RX ADMIN — METHYLPREDNISOLONE ACETATE 80 MG: 80 INJECTION, SUSPENSION INTRA-ARTICULAR; INTRALESIONAL; INTRAMUSCULAR; SOFT TISSUE at 13:20

## 2023-10-03 ASSESSMENT — ENCOUNTER SYMPTOMS
VOMITING: 0
CONSTIPATION: 0
NAUSEA: 0
SHORTNESS OF BREATH: 0
CHEST TIGHTNESS: 0
COUGH: 0
ABDOMINAL PAIN: 0
RESPIRATORY NEGATIVE: 1
COLOR CHANGE: 0
APNEA: 0
ABDOMINAL DISTENTION: 0
DIARRHEA: 0

## 2023-10-03 NOTE — PROGRESS NOTES
The patient will follow-up with me as needed. Follow up:Return if symptoms worsen or fail to improve. Orders Placed This Encounter   Medications    lidocaine 1 % injection 4 mL    methylPREDNISolone acetate (DEPO-MEDROL) injection 80 mg    methylPREDNISolone acetate (DEPO-MEDROL) injection 80 mg         No orders of the defined types were placed in this encounter. This note is created with the assistance of a speech recognition program.  While intending to generate a document that actually reflects the content of the visit, the document can still have some errors including those of syntax and sound a like substitutions which may escape proof reading. In such instances, actual meaning can be extrapolated by contextual diversion.      Electronically signed by Elia Damico PA-C on 10/3/2023 at 1:15 PM